# Patient Record
Sex: FEMALE | Race: WHITE | NOT HISPANIC OR LATINO | URBAN - METROPOLITAN AREA
[De-identification: names, ages, dates, MRNs, and addresses within clinical notes are randomized per-mention and may not be internally consistent; named-entity substitution may affect disease eponyms.]

---

## 2017-02-06 PROBLEM — D04.60 CARCINOMA IN SITU OF SKIN OF UNSPECIFIED UPPER LIMB, INCLUDING SHOULDER: Status: ACTIVE | Noted: 2017-02-06

## 2017-12-06 ENCOUNTER — APPOINTMENT (RX ONLY)
Dept: URBAN - METROPOLITAN AREA CLINIC 4 | Facility: CLINIC | Age: 82
Setting detail: DERMATOLOGY
End: 2017-12-06

## 2017-12-06 DIAGNOSIS — Z85.828 PERSONAL HISTORY OF OTHER MALIGNANT NEOPLASM OF SKIN: ICD-10-CM

## 2017-12-06 DIAGNOSIS — D18.0 HEMANGIOMA: ICD-10-CM

## 2017-12-06 DIAGNOSIS — L81.4 OTHER MELANIN HYPERPIGMENTATION: ICD-10-CM

## 2017-12-06 DIAGNOSIS — L57.0 ACTINIC KERATOSIS: ICD-10-CM

## 2017-12-06 DIAGNOSIS — D22 MELANOCYTIC NEVI: ICD-10-CM

## 2017-12-06 DIAGNOSIS — L82.1 OTHER SEBORRHEIC KERATOSIS: ICD-10-CM

## 2017-12-06 DIAGNOSIS — L82.0 INFLAMED SEBORRHEIC KERATOSIS: ICD-10-CM

## 2017-12-06 DIAGNOSIS — L57.8 OTHER SKIN CHANGES DUE TO CHRONIC EXPOSURE TO NONIONIZING RADIATION: ICD-10-CM

## 2017-12-06 PROBLEM — D18.01 HEMANGIOMA OF SKIN AND SUBCUTANEOUS TISSUE: Status: ACTIVE | Noted: 2017-12-06

## 2017-12-06 PROBLEM — D22.5 MELANOCYTIC NEVI OF TRUNK: Status: ACTIVE | Noted: 2017-12-06

## 2017-12-06 PROCEDURE — 17003 DESTRUCT PREMALG LES 2-14: CPT

## 2017-12-06 PROCEDURE — 99213 OFFICE O/P EST LOW 20 MIN: CPT | Mod: 25

## 2017-12-06 PROCEDURE — ? LIQUID NITROGEN

## 2017-12-06 PROCEDURE — 17000 DESTRUCT PREMALG LESION: CPT | Mod: 59

## 2017-12-06 PROCEDURE — 17110 DESTRUCTION B9 LES UP TO 14: CPT

## 2017-12-06 PROCEDURE — ? COUNSELING

## 2017-12-06 ASSESSMENT — LOCATION SIMPLE DESCRIPTION DERM
LOCATION SIMPLE: RIGHT TEMPLE
LOCATION SIMPLE: LEFT HAND
LOCATION SIMPLE: LEFT CHEEK
LOCATION SIMPLE: CHEST
LOCATION SIMPLE: RIGHT CHEEK
LOCATION SIMPLE: LEFT ANTERIOR NECK
LOCATION SIMPLE: NOSE
LOCATION SIMPLE: RIGHT HAND
LOCATION SIMPLE: RIGHT UPPER BACK

## 2017-12-06 ASSESSMENT — LOCATION ZONE DERM
LOCATION ZONE: HAND
LOCATION ZONE: NOSE
LOCATION ZONE: TRUNK
LOCATION ZONE: NECK
LOCATION ZONE: FACE

## 2017-12-06 ASSESSMENT — LOCATION DETAILED DESCRIPTION DERM
LOCATION DETAILED: LEFT MEDIAL MALAR CHEEK
LOCATION DETAILED: LEFT SUPERIOR ANTERIOR NECK
LOCATION DETAILED: RIGHT SUPERIOR UPPER BACK
LOCATION DETAILED: RIGHT SUPERIOR MEDIAL UPPER BACK
LOCATION DETAILED: UPPER STERNUM
LOCATION DETAILED: RIGHT ULNAR DORSAL HAND
LOCATION DETAILED: RIGHT CENTRAL TEMPLE
LOCATION DETAILED: LEFT ULNAR DORSAL HAND
LOCATION DETAILED: RIGHT INFERIOR CENTRAL MALAR CHEEK
LOCATION DETAILED: RIGHT LATERAL MALAR CHEEK
LOCATION DETAILED: NASAL ROOT

## 2017-12-06 NOTE — PROCEDURE: LIQUID NITROGEN
Duration Of Freeze Thaw-Cycle (Seconds): 3
Detail Level: Simple
Number Of Freeze-Thaw Cycles: 2 freeze-thaw cycles
Render Post-Care Instructions In Note?: no
Post-Care Instructions: I reviewed with the patient in detail post-care instructions. Patient is to wear sunprotection, and avoid picking at any of the treated lesions. Pt may apply Vaseline to crusted or scabbing areas.
Consent: The patient's consent was obtained including but not limited to risks of crusting, scabbing, blistering, scarring, darker or lighter pigmentary change, recurrence, incomplete removal and infection.
Aperture Size (Optional): C
Detail Level: Detailed
Medical Necessity Clause: This procedure was medically necessary because the lesions that were treated were:
Medical Necessity Information: It is in your best interest to select a reason for this procedure from the list below. All of these items fulfill various CMS LCD requirements except the new and changing color options.

## 2018-06-13 ENCOUNTER — APPOINTMENT (RX ONLY)
Dept: URBAN - METROPOLITAN AREA CLINIC 4 | Facility: CLINIC | Age: 83
Setting detail: DERMATOLOGY
End: 2018-06-13

## 2018-06-13 DIAGNOSIS — L57.8 OTHER SKIN CHANGES DUE TO CHRONIC EXPOSURE TO NONIONIZING RADIATION: ICD-10-CM

## 2018-06-13 DIAGNOSIS — L57.0 ACTINIC KERATOSIS: ICD-10-CM

## 2018-06-13 DIAGNOSIS — D18.0 HEMANGIOMA: ICD-10-CM

## 2018-06-13 DIAGNOSIS — L82.1 OTHER SEBORRHEIC KERATOSIS: ICD-10-CM

## 2018-06-13 DIAGNOSIS — L81.4 OTHER MELANIN HYPERPIGMENTATION: ICD-10-CM

## 2018-06-13 DIAGNOSIS — D22 MELANOCYTIC NEVI: ICD-10-CM

## 2018-06-13 DIAGNOSIS — L82.0 INFLAMED SEBORRHEIC KERATOSIS: ICD-10-CM

## 2018-06-13 PROBLEM — D18.01 HEMANGIOMA OF SKIN AND SUBCUTANEOUS TISSUE: Status: ACTIVE | Noted: 2018-06-13

## 2018-06-13 PROBLEM — D48.5 NEOPLASM OF UNCERTAIN BEHAVIOR OF SKIN: Status: ACTIVE | Noted: 2018-06-13

## 2018-06-13 PROBLEM — D22.5 MELANOCYTIC NEVI OF TRUNK: Status: ACTIVE | Noted: 2018-06-13

## 2018-06-13 PROCEDURE — 17000 DESTRUCT PREMALG LESION: CPT | Mod: 59

## 2018-06-13 PROCEDURE — 11100: CPT | Mod: 59

## 2018-06-13 PROCEDURE — 17110 DESTRUCTION B9 LES UP TO 14: CPT

## 2018-06-13 PROCEDURE — 17003 DESTRUCT PREMALG LES 2-14: CPT

## 2018-06-13 PROCEDURE — ? BIOPSY BY SHAVE METHOD

## 2018-06-13 PROCEDURE — 99214 OFFICE O/P EST MOD 30 MIN: CPT | Mod: 25

## 2018-06-13 PROCEDURE — ? LIQUID NITROGEN

## 2018-06-13 PROCEDURE — ? COUNSELING

## 2018-06-13 ASSESSMENT — LOCATION DETAILED DESCRIPTION DERM
LOCATION DETAILED: RIGHT SUPERIOR MEDIAL UPPER BACK
LOCATION DETAILED: RIGHT RADIAL DORSAL HAND
LOCATION DETAILED: LEFT CENTRAL MALAR CHEEK
LOCATION DETAILED: RIGHT PROXIMAL DORSAL FOREARM
LOCATION DETAILED: LEFT INFERIOR FOREHEAD
LOCATION DETAILED: LEFT MEDIAL SUPERIOR CHEST
LOCATION DETAILED: RIGHT SUPERIOR LATERAL BUCCAL CHEEK
LOCATION DETAILED: LEFT SUPERIOR MEDIAL UPPER BACK
LOCATION DETAILED: NASAL DORSUM
LOCATION DETAILED: LEFT INFERIOR MEDIAL FOREHEAD
LOCATION DETAILED: LEFT INFERIOR CENTRAL MALAR CHEEK
LOCATION DETAILED: LEFT DISTAL DORSAL FOREARM
LOCATION DETAILED: NASAL SUPRATIP
LOCATION DETAILED: NASAL ROOT
LOCATION DETAILED: GLABELLA
LOCATION DETAILED: LEFT RADIAL DORSAL HAND
LOCATION DETAILED: RIGHT LATERAL MALAR CHEEK
LOCATION DETAILED: RIGHT MEDIAL MALAR CHEEK
LOCATION DETAILED: RIGHT MID-UPPER BACK
LOCATION DETAILED: RIGHT INFERIOR CENTRAL MALAR CHEEK
LOCATION DETAILED: LEFT SUPERIOR UPPER BACK

## 2018-06-13 ASSESSMENT — LOCATION SIMPLE DESCRIPTION DERM
LOCATION SIMPLE: RIGHT CHEEK
LOCATION SIMPLE: LEFT FOREARM
LOCATION SIMPLE: NOSE
LOCATION SIMPLE: RIGHT FOREARM
LOCATION SIMPLE: LEFT CHEEK
LOCATION SIMPLE: LEFT FOREHEAD
LOCATION SIMPLE: LEFT UPPER BACK
LOCATION SIMPLE: RIGHT HAND
LOCATION SIMPLE: CHEST
LOCATION SIMPLE: RIGHT UPPER BACK
LOCATION SIMPLE: GLABELLA
LOCATION SIMPLE: LEFT HAND

## 2018-06-13 ASSESSMENT — LOCATION ZONE DERM
LOCATION ZONE: TRUNK
LOCATION ZONE: HAND
LOCATION ZONE: ARM
LOCATION ZONE: FACE
LOCATION ZONE: NOSE

## 2018-06-13 NOTE — PROCEDURE: LIQUID NITROGEN
Consent: The patient's consent was obtained including but not limited to risks of crusting, scabbing, blistering, scarring, darker or lighter pigmentary change, recurrence, incomplete removal and infection.
Render Post-Care Instructions In Note?: no
Detail Level: Simple
Duration Of Freeze Thaw-Cycle (Seconds): 3
Post-Care Instructions: I reviewed with the patient in detail post-care instructions. Patient is to wear sunprotection, and avoid picking at any of the treated lesions. Pt may apply Vaseline to crusted or scabbing areas.
Number Of Freeze-Thaw Cycles: 2 freeze-thaw cycles
Aperture Size (Optional): C
Number Of Freeze-Thaw Cycles: 1 freeze-thaw cycle
Medical Necessity Information: It is in your best interest to select a reason for this procedure from the list below. All of these items fulfill various CMS LCD requirements except the new and changing color options.
Detail Level: Detailed
Medical Necessity Clause: This procedure was medically necessary because the lesions that were treated were:

## 2018-06-13 NOTE — PROCEDURE: BIOPSY BY SHAVE METHOD
Biopsy Type: H and E
Dressing: bandage
X Size Of Lesion In Cm: 0
Biopsy Method: Dermablade
Notification Instructions: Patient will be notified of biopsy results. However, patient instructed to call the office if not contacted within 2 weeks.
Lab Facility: 
Electrodesiccation And Curettage Text: The wound bed was treated with electrodesiccation and curettage after the biopsy was performed.
Lab: [id number]
Bill For Surgical Tray: no
Post-Care Instructions: I reviewed with the patient in detail post-care instructions. Patient is to keep the biopsy site dry overnight, and then apply bacitracin twice daily until healed. Patient may apply hydrogen peroxide soaks to remove any crusting.
Anesthesia Type: 1% lidocaine with epinephrine
Silver Nitrate Text: The wound bed was treated with silver nitrate after the biopsy was performed.
Was A Bandage Applied: Yes
Type Of Destruction Used: Electrodesiccation
Anesthesia Volume In Cc: 0.5
Cryotherapy Text: The wound bed was treated with cryotherapy after the biopsy was performed.
Wound Care: Bacitracin
Curettage Text: The wound bed was treated with curettage after the biopsy was performed.
Electrodesiccation Text: The wound bed was treated with electrodesiccation after the biopsy was performed.
Detail Level: Detailed
Hemostasis: Drysol
Billing Type: Third-Party Bill
Consent: Written consent was obtained and risks were reviewed including but not limited to scarring, infection, bleeding, scabbing, incomplete removal, nerve damage and allergy to anesthesia.

## 2018-06-19 ENCOUNTER — APPOINTMENT (RX ONLY)
Dept: URBAN - METROPOLITAN AREA CLINIC 4 | Facility: CLINIC | Age: 83
Setting detail: DERMATOLOGY
End: 2018-06-19

## 2018-06-19 ENCOUNTER — RX ONLY (OUTPATIENT)
Age: 83
Setting detail: RX ONLY
End: 2018-06-19

## 2018-06-19 PROBLEM — C44.91 BASAL CELL CARCINOMA OF SKIN, UNSPECIFIED: Status: ACTIVE | Noted: 2018-06-19

## 2018-06-19 PROCEDURE — ? MOHS SURGERY PHONE CONSULTATION

## 2018-06-19 RX ORDER — AMOXICILLIN 500 MG/1
CAPSULE ORAL
Qty: 4 | Refills: 0

## 2018-10-09 ENCOUNTER — APPOINTMENT (RX ONLY)
Dept: URBAN - METROPOLITAN AREA CLINIC 36 | Facility: CLINIC | Age: 83
Setting detail: DERMATOLOGY
End: 2018-10-09

## 2018-10-09 PROBLEM — C44.91 BASAL CELL CARCINOMA OF SKIN, UNSPECIFIED: Status: ACTIVE | Noted: 2018-10-09

## 2018-10-09 PROCEDURE — 11900 INJECT SKIN LESIONS </W 7: CPT

## 2018-10-09 PROCEDURE — ? INJECTION

## 2018-10-09 NOTE — PROCEDURE: INJECTION
Units: mg
Route: IL
Procedure Information: Please note that the numeric value listed in the Medication (1) and associated J-code units and Medication (2) and associated J-code units variables are j-code amounts and do not represent either the concentration or the total amount of the medications injected.  I strongly recommend selecting no to the Render J-code information in note question. This will allow your note to be more clear. If you are billing j-codes with your injection codes you need to document the total amount of the medication injected. This amount should match the j-code units. For example, if you are injecting Triamcinolone 40mg as an intramuscular injection you would select 40 for the dose field and mg for the units. This would allow you to document  with 4 units (40mg = 10mg x 4). The total volume is not used to calculate j-codes only the amount of the medication administered.
units
Bill J-Code: yes
Consent: The risks of the medication was reviewed with the patient.
Detail Level: None
Post-Care Instructions: I reviewed with the patient in detail post-care instructions. Patient understands to keep the injection sites clean and call the clinic if there is any redness, swelling or pain.
Dose Administered (Numbers Only - Mg, G, Mcg, Units, Cc): 0.2
Dose Administered (Numbers Only - Mg, G, Mcg, Units, Cc): 0
Medication (1) And Associated J-Code Units: Bleomycin, 15 units

## 2018-10-30 ENCOUNTER — APPOINTMENT (RX ONLY)
Dept: URBAN - METROPOLITAN AREA CLINIC 36 | Facility: CLINIC | Age: 83
Setting detail: DERMATOLOGY
End: 2018-10-30

## 2018-10-30 PROBLEM — C44.91 BASAL CELL CARCINOMA OF SKIN, UNSPECIFIED: Status: ACTIVE | Noted: 2018-10-30

## 2018-10-30 PROCEDURE — 11900 INJECT SKIN LESIONS </W 7: CPT

## 2018-10-30 PROCEDURE — ? INJECTION

## 2018-10-30 NOTE — PROCEDURE: INJECTION
Consent: The risks of the medication was reviewed with the patient.
Bill J-Code: yes
Dose Administered (Numbers Only - Mg, G, Mcg, Units, Cc): 0
Post-Care Instructions: I reviewed with the patient in detail post-care instructions. Patient understands to keep the injection sites clean and call the clinic if there is any redness, swelling or pain.
Procedure Information: Please note that the numeric value listed in the Medication (1) and associated J-code units and Medication (2) and associated J-code units variables are j-code amounts and do not represent either the concentration or the total amount of the medications injected.  I strongly recommend selecting no to the Render J-code information in note question. This will allow your note to be more clear. If you are billing j-codes with your injection codes you need to document the total amount of the medication injected. This amount should match the j-code units. For example, if you are injecting Triamcinolone 40mg as an intramuscular injection you would select 40 for the dose field and mg for the units. This would allow you to document  with 4 units (40mg = 10mg x 4). The total volume is not used to calculate j-codes only the amount of the medication administered.
Medication (1) And Associated J-Code Units: Bleomycin, 15 units
Treatment Number: 2
Detail Level: None
Units: mg
Route: IL
units

## 2019-02-13 ENCOUNTER — APPOINTMENT (RX ONLY)
Dept: URBAN - METROPOLITAN AREA CLINIC 36 | Facility: CLINIC | Age: 84
Setting detail: DERMATOLOGY
End: 2019-02-13

## 2019-02-13 PROBLEM — C44.319 BASAL CELL CARCINOMA OF SKIN OF OTHER PARTS OF FACE: Status: ACTIVE | Noted: 2019-02-13

## 2019-02-13 PROCEDURE — 99024 POSTOP FOLLOW-UP VISIT: CPT

## 2019-02-13 PROCEDURE — ? OBSERVATION

## 2020-08-10 ENCOUNTER — APPOINTMENT (OUTPATIENT)
Dept: LAB | Facility: MEDICAL CENTER | Age: 85
End: 2020-08-10
Attending: FAMILY MEDICINE

## 2020-09-17 ENCOUNTER — APPOINTMENT (RX ONLY)
Dept: URBAN - METROPOLITAN AREA CLINIC 4 | Facility: CLINIC | Age: 85
Setting detail: DERMATOLOGY
End: 2020-09-17

## 2020-09-17 DIAGNOSIS — L81.4 OTHER MELANIN HYPERPIGMENTATION: ICD-10-CM

## 2020-09-17 DIAGNOSIS — Z85.828 PERSONAL HISTORY OF OTHER MALIGNANT NEOPLASM OF SKIN: ICD-10-CM

## 2020-09-17 DIAGNOSIS — D22 MELANOCYTIC NEVI: ICD-10-CM

## 2020-09-17 DIAGNOSIS — L57.0 ACTINIC KERATOSIS: ICD-10-CM

## 2020-09-17 DIAGNOSIS — L57.8 OTHER SKIN CHANGES DUE TO CHRONIC EXPOSURE TO NONIONIZING RADIATION: ICD-10-CM

## 2020-09-17 DIAGNOSIS — L82.1 OTHER SEBORRHEIC KERATOSIS: ICD-10-CM

## 2020-09-17 DIAGNOSIS — D18.0 HEMANGIOMA: ICD-10-CM

## 2020-09-17 PROBLEM — D18.01 HEMANGIOMA OF SKIN AND SUBCUTANEOUS TISSUE: Status: ACTIVE | Noted: 2020-09-17

## 2020-09-17 PROBLEM — D22.5 MELANOCYTIC NEVI OF TRUNK: Status: ACTIVE | Noted: 2020-09-17

## 2020-09-17 PROBLEM — D48.5 NEOPLASM OF UNCERTAIN BEHAVIOR OF SKIN: Status: ACTIVE | Noted: 2020-09-17

## 2020-09-17 PROCEDURE — 11102 TANGNTL BX SKIN SINGLE LES: CPT

## 2020-09-17 PROCEDURE — ? LIQUID NITROGEN

## 2020-09-17 PROCEDURE — ? COUNSELING

## 2020-09-17 PROCEDURE — 17003 DESTRUCT PREMALG LES 2-14: CPT

## 2020-09-17 PROCEDURE — 99214 OFFICE O/P EST MOD 30 MIN: CPT | Mod: 25

## 2020-09-17 PROCEDURE — ? BIOPSY BY SHAVE METHOD

## 2020-09-17 PROCEDURE — 11103 TANGNTL BX SKIN EA SEP/ADDL: CPT

## 2020-09-17 PROCEDURE — 17000 DESTRUCT PREMALG LESION: CPT | Mod: 59

## 2020-09-17 ASSESSMENT — LOCATION SIMPLE DESCRIPTION DERM
LOCATION SIMPLE: LEFT NOSE
LOCATION SIMPLE: RIGHT HAND
LOCATION SIMPLE: LEFT HAND
LOCATION SIMPLE: LEFT ANTERIOR NECK
LOCATION SIMPLE: RIGHT UPPER BACK
LOCATION SIMPLE: LEFT CHEEK
LOCATION SIMPLE: RIGHT CHEEK

## 2020-09-17 ASSESSMENT — LOCATION ZONE DERM
LOCATION ZONE: HAND
LOCATION ZONE: NOSE
LOCATION ZONE: FACE
LOCATION ZONE: TRUNK
LOCATION ZONE: NECK

## 2020-09-17 ASSESSMENT — LOCATION DETAILED DESCRIPTION DERM
LOCATION DETAILED: LEFT MEDIAL MALAR CHEEK
LOCATION DETAILED: RIGHT INFERIOR CENTRAL MALAR CHEEK
LOCATION DETAILED: RIGHT MEDIAL MALAR CHEEK
LOCATION DETAILED: RIGHT SUPERIOR UPPER BACK
LOCATION DETAILED: LEFT ULNAR DORSAL HAND
LOCATION DETAILED: RIGHT SUPERIOR MEDIAL UPPER BACK
LOCATION DETAILED: RIGHT ULNAR DORSAL HAND
LOCATION DETAILED: LEFT NASAL SIDEWALL
LOCATION DETAILED: LEFT SUPERIOR ANTERIOR NECK
LOCATION DETAILED: RIGHT CENTRAL MALAR CHEEK

## 2020-09-30 ENCOUNTER — APPOINTMENT (RX ONLY)
Dept: URBAN - METROPOLITAN AREA CLINIC 4 | Facility: CLINIC | Age: 85
Setting detail: DERMATOLOGY
End: 2020-09-30

## 2020-09-30 DIAGNOSIS — L57.8 OTHER SKIN CHANGES DUE TO CHRONIC EXPOSURE TO NONIONIZING RADIATION: ICD-10-CM

## 2020-09-30 PROBLEM — D04.39 CARCINOMA IN SITU OF SKIN OF OTHER PARTS OF FACE: Status: ACTIVE | Noted: 2020-09-30

## 2020-09-30 PROBLEM — C44.01 BASAL CELL CARCINOMA OF SKIN OF LIP: Status: ACTIVE | Noted: 2020-09-30

## 2020-09-30 PROBLEM — C44.321 SQUAMOUS CELL CARCINOMA OF SKIN OF NOSE: Status: ACTIVE | Noted: 2020-09-30

## 2020-09-30 PROCEDURE — 11103 TANGNTL BX SKIN EA SEP/ADDL: CPT

## 2020-09-30 PROCEDURE — ? BIOPSY BY SHAVE METHOD AND DESTRUCTION

## 2020-09-30 PROCEDURE — 99212 OFFICE O/P EST SF 10 MIN: CPT | Mod: 25

## 2020-09-30 PROCEDURE — ? SHAVE REMOVAL AND DESTRUCTION

## 2020-09-30 PROCEDURE — 11102 TANGNTL BX SKIN SINGLE LES: CPT | Mod: 59

## 2020-09-30 PROCEDURE — ? COUNSELING

## 2020-09-30 PROCEDURE — 11310 SHAVE SKIN LESION 0.5 CM/<: CPT

## 2020-09-30 ASSESSMENT — LOCATION DETAILED DESCRIPTION DERM
LOCATION DETAILED: RIGHT MEDIAL MALAR CHEEK
LOCATION DETAILED: LEFT INFERIOR CENTRAL MALAR CHEEK
LOCATION DETAILED: RIGHT CENTRAL MALAR CHEEK
LOCATION DETAILED: RIGHT INFERIOR MEDIAL FOREHEAD
LOCATION DETAILED: LEFT MEDIAL MALAR CHEEK
LOCATION DETAILED: RIGHT INFERIOR FOREHEAD
LOCATION DETAILED: LEFT SUPERIOR CENTRAL BUCCAL CHEEK
LOCATION DETAILED: LEFT INFERIOR MEDIAL FOREHEAD

## 2020-09-30 ASSESSMENT — LOCATION SIMPLE DESCRIPTION DERM
LOCATION SIMPLE: LEFT CHEEK
LOCATION SIMPLE: RIGHT CHEEK
LOCATION SIMPLE: RIGHT FOREHEAD
LOCATION SIMPLE: LEFT FOREHEAD

## 2020-09-30 ASSESSMENT — LOCATION ZONE DERM: LOCATION ZONE: FACE

## 2020-09-30 NOTE — PROCEDURE: SHAVE REMOVAL AND DESTRUCTION
Detail Level: Detailed
Size Of Lesion In Cm: 0
Size After Destruction (Required For Destruction Billing): 0.4
Anesthesia Type: 1% lidocaine with epinephrine
Hemostasis: Drysol
Cautery Type: electrodesiccation
Was Curettage Performed?: Yes
Number Of Curettages: 3
Wound Care: Petrolatum
Dressing: dry sterile dressing
Bill As?: Note: Bill Malignant Destruction If Path Confirms Malignant Lesion. Only Bill As Shave Removal If Path Comes Back Benign. Do Not Bill Shave Removal On Malignant Lesions.: Shave Removal
Lab: [id number]
Lab Facility: 
Render Path Notes In Note?: No
Consent: Written consent was obtained and risks were reviewed including but not limited to scarring, infection, bleeding, scabbing, incomplete removal, nerve damage and allergy to anesthesia.
Post-Care Instructions: I reviewed with the patient in detail post-care instructions. Patient is to keep the biopsy site dry overnight, and then apply bacitracin twice daily until healed. Patient may apply hydrogen peroxide soaks to remove any crusting.
Notification Instructions: Patient will be notified of biopsy results. However, patient instructed to call the office if not contacted within 2 weeks.
Billing Type: Third-Party Bill

## 2020-09-30 NOTE — PROCEDURE: BIOPSY BY SHAVE METHOD AND DESTRUCTION
Detail Level: Detailed
Biopsy Type: H and E
Bill As?: Biopsy by Shave Method
Size Of Lesion In Cm (Optional): 0
Size Of Lesion After Curettage: 1
Anesthesia Type: 1% lidocaine with epinephrine
Anesthesia Volume In Cc: 0.5
Hemostasis: Drysol
Destruction Type: electrodesiccation
Number Of Curettages: 3
Wound Care: Petrolatum
Lab: [id number]
Lab Facility: 
Render Path Notes In Note?: No
Consent: Written consent was obtained and risks were reviewed including but not limited to scarring, infection, bleeding, scabbing, incomplete removal, nerve damage and allergy to anesthesia.
Post-Care Instructions: I reviewed with the patient in detail post-care instructions. Patient is to keep the biopsy site dry overnight, and then apply bacitracin twice daily until healed. Patient may apply hydrogen peroxide soaks to remove any crusting.
Notification Instructions: Patient will be notified of biopsy results. However, patient instructed to call the office if not contacted within 2 weeks.
Billing Type: Third-Party Bill
Size Of Lesion After Curettage: 0.6

## 2021-01-20 DIAGNOSIS — Z23 NEED FOR VACCINATION: ICD-10-CM

## 2021-06-12 ENCOUNTER — APPOINTMENT (OUTPATIENT)
Dept: RADIOLOGY | Facility: MEDICAL CENTER | Age: 86
DRG: 023 | End: 2021-06-12
Attending: PSYCHIATRY & NEUROLOGY
Payer: COMMERCIAL

## 2021-06-12 ENCOUNTER — APPOINTMENT (OUTPATIENT)
Dept: RADIOLOGY | Facility: MEDICAL CENTER | Age: 86
DRG: 023 | End: 2021-06-12
Attending: EMERGENCY MEDICINE
Payer: COMMERCIAL

## 2021-06-12 ENCOUNTER — HOSPITAL ENCOUNTER (INPATIENT)
Facility: MEDICAL CENTER | Age: 86
LOS: 8 days | DRG: 023 | End: 2021-06-20
Attending: EMERGENCY MEDICINE | Admitting: STUDENT IN AN ORGANIZED HEALTH CARE EDUCATION/TRAINING PROGRAM
Payer: COMMERCIAL

## 2021-06-12 DIAGNOSIS — I63.511 ACUTE RIGHT MCA STROKE (HCC): ICD-10-CM

## 2021-06-12 DIAGNOSIS — R79.89 ELEVATED TROPONIN: ICD-10-CM

## 2021-06-12 DIAGNOSIS — I63.511 ACUTE STROKE DUE TO OCCLUSION OF RIGHT MIDDLE CEREBRAL ARTERY (HCC): ICD-10-CM

## 2021-06-12 PROBLEM — I10 HYPERTENSION: Status: ACTIVE | Noted: 2021-06-12

## 2021-06-12 PROBLEM — I35.0 AORTIC STENOSIS: Status: ACTIVE | Noted: 2021-06-12

## 2021-06-12 LAB
ABO GROUP BLD: NORMAL
ALBUMIN SERPL BCP-MCNC: 3 G/DL (ref 3.2–4.9)
ALBUMIN/GLOB SERPL: 1 G/DL
ALP SERPL-CCNC: 61 U/L (ref 30–99)
ALT SERPL-CCNC: 14 U/L (ref 2–50)
ANION GAP SERPL CALC-SCNC: 12 MMOL/L (ref 7–16)
APTT PPP: 35.9 SEC (ref 24.7–36)
AST SERPL-CCNC: 25 U/L (ref 12–45)
BASOPHILS # BLD AUTO: 0.1 % (ref 0–1.8)
BASOPHILS # BLD: 0.02 K/UL (ref 0–0.12)
BILIRUB SERPL-MCNC: 0.8 MG/DL (ref 0.1–1.5)
BLD GP AB SCN SERPL QL: NORMAL
BUN SERPL-MCNC: 20 MG/DL (ref 8–22)
CALCIUM SERPL-MCNC: 8.2 MG/DL (ref 8.5–10.5)
CHLORIDE SERPL-SCNC: 106 MMOL/L (ref 96–112)
CO2 SERPL-SCNC: 20 MMOL/L (ref 20–33)
CREAT SERPL-MCNC: 0.84 MG/DL (ref 0.5–1.4)
EKG IMPRESSION: NORMAL
EOSINOPHIL # BLD AUTO: 0 K/UL (ref 0–0.51)
EOSINOPHIL NFR BLD: 0 % (ref 0–6.9)
ERYTHROCYTE [DISTWIDTH] IN BLOOD BY AUTOMATED COUNT: 53.3 FL (ref 35.9–50)
GLOBULIN SER CALC-MCNC: 2.9 G/DL (ref 1.9–3.5)
GLUCOSE SERPL-MCNC: 99 MG/DL (ref 65–99)
HCT VFR BLD AUTO: 33.8 % (ref 37–47)
HGB BLD-MCNC: 10.9 G/DL (ref 12–16)
IMM GRANULOCYTES # BLD AUTO: 0.08 K/UL (ref 0–0.11)
IMM GRANULOCYTES NFR BLD AUTO: 0.6 % (ref 0–0.9)
INR PPP: 1.28 (ref 0.87–1.13)
LYMPHOCYTES # BLD AUTO: 0.97 K/UL (ref 1–4.8)
LYMPHOCYTES NFR BLD: 6.8 % (ref 22–41)
MCH RBC QN AUTO: 30 PG (ref 27–33)
MCHC RBC AUTO-ENTMCNC: 32.2 G/DL (ref 33.6–35)
MCV RBC AUTO: 93.1 FL (ref 81.4–97.8)
MONOCYTES # BLD AUTO: 0.69 K/UL (ref 0–0.85)
MONOCYTES NFR BLD AUTO: 4.8 % (ref 0–13.4)
NEUTROPHILS # BLD AUTO: 12.52 K/UL (ref 2–7.15)
NEUTROPHILS NFR BLD: 87.7 % (ref 44–72)
NRBC # BLD AUTO: 0 K/UL
NRBC BLD-RTO: 0 /100 WBC
PLATELET # BLD AUTO: 145 K/UL (ref 164–446)
PMV BLD AUTO: 10.8 FL (ref 9–12.9)
POTASSIUM SERPL-SCNC: 3.8 MMOL/L (ref 3.6–5.5)
PROT SERPL-MCNC: 5.9 G/DL (ref 6–8.2)
PROTHROMBIN TIME: 15.7 SEC (ref 12–14.6)
RBC # BLD AUTO: 3.63 M/UL (ref 4.2–5.4)
RH BLD: NORMAL
SODIUM SERPL-SCNC: 138 MMOL/L (ref 135–145)
TROPONIN T SERPL-MCNC: 141 NG/L (ref 6–19)
WBC # BLD AUTO: 14.3 K/UL (ref 4.8–10.8)

## 2021-06-12 PROCEDURE — 86901 BLOOD TYPING SEROLOGIC RH(D): CPT

## 2021-06-12 PROCEDURE — 700111 HCHG RX REV CODE 636 W/ 250 OVERRIDE (IP)

## 2021-06-12 PROCEDURE — 85730 THROMBOPLASTIN TIME PARTIAL: CPT

## 2021-06-12 PROCEDURE — C1769 GUIDE WIRE: HCPCS

## 2021-06-12 PROCEDURE — 700105 HCHG RX REV CODE 258: Performed by: INTERNAL MEDICINE

## 2021-06-12 PROCEDURE — 99291 CRITICAL CARE FIRST HOUR: CPT | Performed by: INTERNAL MEDICINE

## 2021-06-12 PROCEDURE — 86900 BLOOD TYPING SEROLOGIC ABO: CPT

## 2021-06-12 PROCEDURE — 770022 HCHG ROOM/CARE - ICU (200)

## 2021-06-12 PROCEDURE — 86850 RBC ANTIBODY SCREEN: CPT

## 2021-06-12 PROCEDURE — 0042T CT-CEREBRAL PERFUSION ANALYSIS: CPT

## 2021-06-12 PROCEDURE — 700111 HCHG RX REV CODE 636 W/ 250 OVERRIDE (IP): Performed by: INTERNAL MEDICINE

## 2021-06-12 PROCEDURE — 76377 3D RENDER W/INTRP POSTPROCES: CPT

## 2021-06-12 PROCEDURE — 700117 HCHG RX CONTRAST REV CODE 255: Performed by: EMERGENCY MEDICINE

## 2021-06-12 PROCEDURE — 84484 ASSAY OF TROPONIN QUANT: CPT

## 2021-06-12 PROCEDURE — 700111 HCHG RX REV CODE 636 W/ 250 OVERRIDE (IP): Performed by: RADIOLOGY

## 2021-06-12 PROCEDURE — B31R1ZZ FLUOROSCOPY OF INTRACRANIAL ARTERIES USING LOW OSMOLAR CONTRAST: ICD-10-PCS | Performed by: RADIOLOGY

## 2021-06-12 PROCEDURE — 99291 CRITICAL CARE FIRST HOUR: CPT

## 2021-06-12 PROCEDURE — 96374 THER/PROPH/DIAG INJ IV PUSH: CPT

## 2021-06-12 PROCEDURE — 99223 1ST HOSP IP/OBS HIGH 75: CPT | Performed by: STUDENT IN AN ORGANIZED HEALTH CARE EDUCATION/TRAINING PROGRAM

## 2021-06-12 PROCEDURE — 70496 CT ANGIOGRAPHY HEAD: CPT

## 2021-06-12 PROCEDURE — 70498 CT ANGIOGRAPHY NECK: CPT

## 2021-06-12 PROCEDURE — 85025 COMPLETE CBC W/AUTO DIFF WBC: CPT

## 2021-06-12 PROCEDURE — 70450 CT HEAD/BRAIN W/O DYE: CPT

## 2021-06-12 PROCEDURE — 03CG3ZZ EXTIRPATION OF MATTER FROM INTRACRANIAL ARTERY, PERCUTANEOUS APPROACH: ICD-10-PCS | Performed by: RADIOLOGY

## 2021-06-12 PROCEDURE — 93005 ELECTROCARDIOGRAM TRACING: CPT | Performed by: EMERGENCY MEDICINE

## 2021-06-12 PROCEDURE — 80053 COMPREHEN METABOLIC PANEL: CPT

## 2021-06-12 PROCEDURE — 85610 PROTHROMBIN TIME: CPT

## 2021-06-12 PROCEDURE — 700117 HCHG RX CONTRAST REV CODE 255: Performed by: PSYCHIATRY & NEUROLOGY

## 2021-06-12 PROCEDURE — 99291 CRITICAL CARE FIRST HOUR: CPT | Performed by: PSYCHIATRY & NEUROLOGY

## 2021-06-12 PROCEDURE — 96375 TX/PRO/DX INJ NEW DRUG ADDON: CPT

## 2021-06-12 RX ORDER — SODIUM CHLORIDE 9 MG/ML
500 INJECTION, SOLUTION INTRAVENOUS
Status: ACTIVE | OUTPATIENT
Start: 2021-06-12 | End: 2021-06-13

## 2021-06-12 RX ORDER — ATORVASTATIN CALCIUM 20 MG/1
40 TABLET, FILM COATED ORAL EVERY EVENING
Status: DISCONTINUED | OUTPATIENT
Start: 2021-06-12 | End: 2021-06-12

## 2021-06-12 RX ORDER — HYDRALAZINE HYDROCHLORIDE 20 MG/ML
10 INJECTION INTRAMUSCULAR; INTRAVENOUS
Status: DISCONTINUED | OUTPATIENT
Start: 2021-06-12 | End: 2021-06-12

## 2021-06-12 RX ORDER — PHENYLEPHRINE HCL IN 0.9% NACL 0.5 MG/5ML
100 SYRINGE (ML) INTRAVENOUS
Status: ACTIVE | OUTPATIENT
Start: 2021-06-12 | End: 2021-06-13

## 2021-06-12 RX ORDER — ATORVASTATIN CALCIUM 40 MG/1
40 TABLET, FILM COATED ORAL EVERY EVENING
Status: DISCONTINUED | OUTPATIENT
Start: 2021-06-12 | End: 2021-06-15

## 2021-06-12 RX ORDER — LABETALOL HYDROCHLORIDE 5 MG/ML
10 INJECTION, SOLUTION INTRAVENOUS
Status: DISCONTINUED | OUTPATIENT
Start: 2021-06-12 | End: 2021-06-12

## 2021-06-12 RX ORDER — ASPIRIN 81 MG/1
81 TABLET, CHEWABLE ORAL DAILY
Status: DISCONTINUED | OUTPATIENT
Start: 2021-06-13 | End: 2021-06-15

## 2021-06-12 RX ORDER — LABETALOL HYDROCHLORIDE 5 MG/ML
10 INJECTION, SOLUTION INTRAVENOUS
Status: DISCONTINUED | OUTPATIENT
Start: 2021-06-12 | End: 2021-06-13

## 2021-06-12 RX ORDER — HEPARIN SODIUM 1000 [USP'U]/ML
INJECTION, SOLUTION INTRAVENOUS; SUBCUTANEOUS
Status: COMPLETED
Start: 2021-06-12 | End: 2021-06-12

## 2021-06-12 RX ORDER — HYDRALAZINE HYDROCHLORIDE 20 MG/ML
10 INJECTION INTRAMUSCULAR; INTRAVENOUS
Status: DISCONTINUED | OUTPATIENT
Start: 2021-06-12 | End: 2021-06-13

## 2021-06-12 RX ORDER — ALENDRONATE SODIUM 70 MG/1
70 TABLET ORAL
COMMUNITY

## 2021-06-12 RX ORDER — POTASSIUM CHLORIDE 20 MEQ/1
20 TABLET, EXTENDED RELEASE ORAL DAILY
Status: ON HOLD | COMMUNITY
End: 2021-06-19

## 2021-06-12 RX ORDER — VERAPAMIL HYDROCHLORIDE 2.5 MG/ML
INJECTION, SOLUTION INTRAVENOUS
Status: COMPLETED
Start: 2021-06-12 | End: 2021-06-12

## 2021-06-12 RX ORDER — SODIUM CHLORIDE, SODIUM LACTATE, POTASSIUM CHLORIDE, CALCIUM CHLORIDE 600; 310; 30; 20 MG/100ML; MG/100ML; MG/100ML; MG/100ML
INJECTION, SOLUTION INTRAVENOUS CONTINUOUS
Status: DISCONTINUED | OUTPATIENT
Start: 2021-06-12 | End: 2021-06-14

## 2021-06-12 RX ORDER — MIDAZOLAM HYDROCHLORIDE 1 MG/ML
.5-2 INJECTION INTRAMUSCULAR; INTRAVENOUS PRN
Status: ACTIVE | OUTPATIENT
Start: 2021-06-12 | End: 2021-06-13

## 2021-06-12 RX ORDER — HEPARIN SODIUM 1000 [USP'U]/ML
1000 INJECTION, SOLUTION INTRAVENOUS; SUBCUTANEOUS ONCE
Status: COMPLETED | OUTPATIENT
Start: 2021-06-12 | End: 2021-06-12

## 2021-06-12 RX ORDER — HYDRALAZINE HYDROCHLORIDE 20 MG/ML
INJECTION INTRAMUSCULAR; INTRAVENOUS
Status: DISPENSED
Start: 2021-06-12 | End: 2021-06-13

## 2021-06-12 RX ORDER — POTASSIUM CHLORIDE 1.5 G/1.58G
20 POWDER, FOR SOLUTION ORAL DAILY
COMMUNITY
End: 2021-06-12

## 2021-06-12 RX ORDER — FUROSEMIDE 20 MG/1
20 TABLET ORAL DAILY
Status: ON HOLD | COMMUNITY
End: 2021-06-19

## 2021-06-12 RX ORDER — VERAPAMIL HYDROCHLORIDE 2.5 MG/ML
5 INJECTION, SOLUTION INTRAVENOUS ONCE
Status: COMPLETED | OUTPATIENT
Start: 2021-06-12 | End: 2021-06-12

## 2021-06-12 RX ADMIN — IOHEXOL 125 ML: 300 INJECTION, SOLUTION INTRAVENOUS at 22:27

## 2021-06-12 RX ADMIN — FENTANYL CITRATE 25 MCG: 50 INJECTION, SOLUTION INTRAMUSCULAR; INTRAVENOUS at 21:46

## 2021-06-12 RX ADMIN — HYDRALAZINE HYDROCHLORIDE 10 MG: 20 INJECTION INTRAMUSCULAR; INTRAVENOUS at 22:47

## 2021-06-12 RX ADMIN — SODIUM CHLORIDE, POTASSIUM CHLORIDE, SODIUM LACTATE AND CALCIUM CHLORIDE 1000 ML: 600; 310; 30; 20 INJECTION, SOLUTION INTRAVENOUS at 23:51

## 2021-06-12 RX ADMIN — IOHEXOL 80 ML: 350 INJECTION, SOLUTION INTRAVENOUS at 18:56

## 2021-06-12 RX ADMIN — IOHEXOL 40 ML: 350 INJECTION, SOLUTION INTRAVENOUS at 18:54

## 2021-06-12 RX ADMIN — FENTANYL CITRATE 25 MCG: 50 INJECTION, SOLUTION INTRAMUSCULAR; INTRAVENOUS at 21:35

## 2021-06-12 RX ADMIN — VERAPAMIL HYDROCHLORIDE 5 MG: 2.5 INJECTION, SOLUTION INTRAVENOUS at 20:35

## 2021-06-12 RX ADMIN — HEPARIN SODIUM 1000 UNITS: 1000 INJECTION, SOLUTION INTRAVENOUS; SUBCUTANEOUS at 20:35

## 2021-06-12 RX ADMIN — NITROGLYCERIN 200 MCG: 20 INJECTION INTRAVENOUS at 20:35

## 2021-06-13 ENCOUNTER — APPOINTMENT (OUTPATIENT)
Dept: RADIOLOGY | Facility: MEDICAL CENTER | Age: 86
DRG: 023 | End: 2021-06-13
Attending: PSYCHIATRY & NEUROLOGY
Payer: COMMERCIAL

## 2021-06-13 ENCOUNTER — APPOINTMENT (OUTPATIENT)
Dept: RADIOLOGY | Facility: MEDICAL CENTER | Age: 86
DRG: 023 | End: 2021-06-13
Attending: EMERGENCY MEDICINE
Payer: COMMERCIAL

## 2021-06-13 PROBLEM — R54 AGE-RELATED PHYSICAL DEBILITY: Status: ACTIVE | Noted: 2021-06-13

## 2021-06-13 PROBLEM — M81.0 OSTEOPOROSIS: Status: ACTIVE | Noted: 2021-06-13

## 2021-06-13 LAB
ABO + RH BLD: NORMAL
ANION GAP SERPL CALC-SCNC: 11 MMOL/L (ref 7–16)
BASOPHILS # BLD AUTO: 0.4 % (ref 0–1.8)
BASOPHILS # BLD: 0.05 K/UL (ref 0–0.12)
BUN SERPL-MCNC: 17 MG/DL (ref 8–22)
CALCIUM SERPL-MCNC: 8.4 MG/DL (ref 8.5–10.5)
CHLORIDE SERPL-SCNC: 106 MMOL/L (ref 96–112)
CHOLEST SERPL-MCNC: 127 MG/DL (ref 100–199)
CO2 SERPL-SCNC: 19 MMOL/L (ref 20–33)
CREAT SERPL-MCNC: 0.78 MG/DL (ref 0.5–1.4)
EOSINOPHIL # BLD AUTO: 0 K/UL (ref 0–0.51)
EOSINOPHIL NFR BLD: 0 % (ref 0–6.9)
ERYTHROCYTE [DISTWIDTH] IN BLOOD BY AUTOMATED COUNT: 53.3 FL (ref 35.9–50)
GLUCOSE SERPL-MCNC: 111 MG/DL (ref 65–99)
HCT VFR BLD AUTO: 33.3 % (ref 37–47)
HDLC SERPL-MCNC: 48 MG/DL
HGB BLD-MCNC: 11 G/DL (ref 12–16)
IMM GRANULOCYTES # BLD AUTO: 0.05 K/UL (ref 0–0.11)
IMM GRANULOCYTES NFR BLD AUTO: 0.4 % (ref 0–0.9)
LDLC SERPL CALC-MCNC: 69 MG/DL
LYMPHOCYTES # BLD AUTO: 0.91 K/UL (ref 1–4.8)
LYMPHOCYTES NFR BLD: 7.9 % (ref 22–41)
MAGNESIUM SERPL-MCNC: 1.5 MG/DL (ref 1.5–2.5)
MCH RBC QN AUTO: 30.4 PG (ref 27–33)
MCHC RBC AUTO-ENTMCNC: 33 G/DL (ref 33.6–35)
MCV RBC AUTO: 92 FL (ref 81.4–97.8)
MONOCYTES # BLD AUTO: 0.83 K/UL (ref 0–0.85)
MONOCYTES NFR BLD AUTO: 7.2 % (ref 0–13.4)
NEUTROPHILS # BLD AUTO: 9.71 K/UL (ref 2–7.15)
NEUTROPHILS NFR BLD: 84.1 % (ref 44–72)
NRBC # BLD AUTO: 0 K/UL
NRBC BLD-RTO: 0 /100 WBC
PLATELET # BLD AUTO: 173 K/UL (ref 164–446)
PMV BLD AUTO: 10.3 FL (ref 9–12.9)
POTASSIUM SERPL-SCNC: 3.7 MMOL/L (ref 3.6–5.5)
RBC # BLD AUTO: 3.62 M/UL (ref 4.2–5.4)
SODIUM SERPL-SCNC: 136 MMOL/L (ref 135–145)
TRIGL SERPL-MCNC: 49 MG/DL (ref 0–149)
TROPONIN T SERPL-MCNC: 181 NG/L (ref 6–19)
TROPONIN T SERPL-MCNC: 189 NG/L (ref 6–19)
WBC # BLD AUTO: 11.6 K/UL (ref 4.8–10.8)

## 2021-06-13 PROCEDURE — 700105 HCHG RX REV CODE 258: Performed by: INTERNAL MEDICINE

## 2021-06-13 PROCEDURE — 83735 ASSAY OF MAGNESIUM: CPT

## 2021-06-13 PROCEDURE — 70551 MRI BRAIN STEM W/O DYE: CPT | Mod: MG

## 2021-06-13 PROCEDURE — 92610 EVALUATE SWALLOWING FUNCTION: CPT

## 2021-06-13 PROCEDURE — 80061 LIPID PANEL: CPT

## 2021-06-13 PROCEDURE — 71045 X-RAY EXAM CHEST 1 VIEW: CPT

## 2021-06-13 PROCEDURE — 84484 ASSAY OF TROPONIN QUANT: CPT | Mod: 91

## 2021-06-13 PROCEDURE — 99233 SBSQ HOSP IP/OBS HIGH 50: CPT | Performed by: PSYCHIATRY & NEUROLOGY

## 2021-06-13 PROCEDURE — 770022 HCHG ROOM/CARE - ICU (200)

## 2021-06-13 PROCEDURE — 700111 HCHG RX REV CODE 636 W/ 250 OVERRIDE (IP): Performed by: INTERNAL MEDICINE

## 2021-06-13 PROCEDURE — 99291 CRITICAL CARE FIRST HOUR: CPT | Performed by: NURSE PRACTITIONER

## 2021-06-13 PROCEDURE — 85025 COMPLETE CBC W/AUTO DIFF WBC: CPT

## 2021-06-13 PROCEDURE — 80048 BASIC METABOLIC PNL TOTAL CA: CPT

## 2021-06-13 RX ORDER — LABETALOL HYDROCHLORIDE 5 MG/ML
10 INJECTION, SOLUTION INTRAVENOUS
Status: DISCONTINUED | OUTPATIENT
Start: 2021-06-13 | End: 2021-06-16

## 2021-06-13 RX ORDER — HYDRALAZINE HYDROCHLORIDE 20 MG/ML
10 INJECTION INTRAMUSCULAR; INTRAVENOUS
Status: DISCONTINUED | OUTPATIENT
Start: 2021-06-13 | End: 2021-06-13

## 2021-06-13 RX ORDER — PHENYLEPHRINE HCL IN 0.9% NACL 0.5 MG/5ML
SYRINGE (ML) INTRAVENOUS
Status: ACTIVE
Start: 2021-06-13 | End: 2021-06-13

## 2021-06-13 RX ORDER — HYDRALAZINE HYDROCHLORIDE 20 MG/ML
10 INJECTION INTRAMUSCULAR; INTRAVENOUS
Status: DISCONTINUED | OUTPATIENT
Start: 2021-06-13 | End: 2021-06-16

## 2021-06-13 RX ORDER — LABETALOL HYDROCHLORIDE 5 MG/ML
10 INJECTION, SOLUTION INTRAVENOUS
Status: DISCONTINUED | OUTPATIENT
Start: 2021-06-13 | End: 2021-06-13

## 2021-06-13 RX ADMIN — SODIUM CHLORIDE, POTASSIUM CHLORIDE, SODIUM LACTATE AND CALCIUM CHLORIDE: 600; 310; 30; 20 INJECTION, SOLUTION INTRAVENOUS at 19:08

## 2021-06-13 RX ADMIN — Medication 100 MCG: at 00:44

## 2021-06-13 RX ADMIN — Medication 100 MCG: at 01:15

## 2021-06-13 RX ADMIN — PHENYLEPHRINE HYDROCHLORIDE 20 MCG/MIN: 10 INJECTION INTRAVENOUS at 02:37

## 2021-06-13 ASSESSMENT — ENCOUNTER SYMPTOMS
FOCAL WEAKNESS: 1
SHORTNESS OF BREATH: 0
NAUSEA: 0

## 2021-06-13 ASSESSMENT — FIBROSIS 4 INDEX: FIB4 SCORE: 4.56

## 2021-06-13 ASSESSMENT — PAIN DESCRIPTION - PAIN TYPE
TYPE: ACUTE PAIN
TYPE: ACUTE PAIN

## 2021-06-13 ASSESSMENT — PATIENT HEALTH QUESTIONNAIRE - PHQ9
1. LITTLE INTEREST OR PLEASURE IN DOING THINGS: NOT AT ALL
2. FEELING DOWN, DEPRESSED, IRRITABLE, OR HOPELESS: NOT AT ALL
SUM OF ALL RESPONSES TO PHQ9 QUESTIONS 1 AND 2: 0

## 2021-06-13 NOTE — ASSESSMENT & PLAN NOTE
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

## 2021-06-13 NOTE — PROGRESS NOTES
IR Nursing Note:    Cerebral angiogram with mechanical thrombectomy by Dr. Smith assisted by RT Frank, right groin access site CDI with gauze and a tegaderm dressing. Pre-procedure left post-tib auscultated with doppler right pedal pulse auscultated with doppler, penumbra system was used to retrieve clot.   ?  Patient tolerated procedure, Vital signs were taken every 5 minutes and remained within parameters (see doc flow sheets) ;hemostasis achieved using an AngioSeal vascular closure device deployed at 2156; report given to RN Patriciai; patient transported to ICU with RN and monitor     TICI score 2b  Procedure stop time- 2156     Post procedure pedal pulses auscultated left post tib, right pedal    TERUMO Angio-Seal VIP 8F  REF# [id number] LOT# [id number] EXP. 03/31/2022

## 2021-06-13 NOTE — H&P
Hospital Medicine History & Physical Note    Date of Service  6/12/2021    Primary Care Physician  No primary care provider on file.    Consultants  Neurology   Neuro IR   MICU     Code Status  Full Code    Chief Complaint  Chief Complaint   Patient presents with   • Possible Stroke     Pt presents to the ED after family called [facility]; per family pt was speaking clearly on the phone this morning at around [id number]. Pt normall lives alone and is independent. HX and medication list unknown.        History of Presenting Illness  89 y.o. female with HTN and cerebral aneurysm who presented 6/12/2021 with being found down on the ground.  Patient is able to function independently without a walker.  Last seen normal at around 11 AM today when on the phone with a friend.  At around 530, patient was found in her home altered with neurologic deficits. 911 was called, and patient was taken to ER.     In the ED, patient found to have normal vital signs. Remarkable labs include neutrophilic leukocytosis, normocytic anemia. CT head shows hyperdense right MCA with right temporal and inferior frontoparietal lobe edema with slight loss of gray-white differentitation consistent infarct with subtle area of increased attenuation at the junction of the right frontoparietal lobe that could represent some petechial hemorrhage. CT angio head with the right middle cerebral artery M1 occlusion. Patient will be admitted to MICU. Neuro IR was consulted.       Review of Systems  ROS  Unable to assess due to acuity of condition    Past Medical History   has a past medical history of Aneurysm ([facility]) (2000) and SBO (small bowel obstruction) (Former[facility]) (2013).    Surgical History  No pertinent surgical history     Family History  No pertinent family history      Social History   Unknown    Allergies  Unable to attain at this time     Medications  Prior to Admission Medications   Prescriptions Last Dose Informant Patient Reported? Taking?   NON SPECIFIED  unknown at unknown Family Member Yes Yes   Sig: Take 1 tablet by mouth 2 times a day. unknown   alendronate (FOSAMAX) 70 MG Tab unknown at unknown Family Member Yes Yes   Sig: Take 70 mg by mouth every 7 days.   furosemide (LASIX) 20 MG Tab unknown at unknown Family Member Yes Yes   Sig: Take 20 mg by mouth every day.   potassium chloride SA (KDUR) 20 MEQ Tab CR unknown at unknown Family Member Yes Yes   Sig: Take 20 mEq by mouth every day.      Facility-Administered Medications: None       Physical Exam  Temp:  [37.2 °C (98.9 °F)] 37.2 °C (98.9 °F)  Pulse:  [81] 81  Resp:  [16-39] 39  BP: (163-176)/(71-74) 163/71  SpO2:  [93 %] 93 %    Physical Exam  Constitutional:       Appearance: Normal appearance. She is normal weight.   HENT:      Head: Normocephalic.      Mouth/Throat:      Mouth: Mucous membranes are moist.   Eyes:      Pupils: Pupils are equal, round, and reactive to light.   Neck:      Comments: Unable to assess   Cardiovascular:      Rate and Rhythm: Normal rate and regular rhythm.      Pulses: Normal pulses.      Comments: Systolic ejection murmur with radiation to carotids  Pulmonary:      Effort: Pulmonary effort is normal.      Breath sounds: Normal breath sounds.   Abdominal:      General: Abdomen is flat. Bowel sounds are normal.      Palpations: Abdomen is soft.   Musculoskeletal:         General: No swelling.   Neurological:      Cranial Nerves: Cranial nerve deficit present.      Comments: Follows simple commands  Left sided facial droop   Dysphasic  Strength 0/5 in LUE and LLE   Strength 3/5 in RUE and RLE          Laboratory:  Recent Labs     06/12/21  1831   WBC 14.3*   RBC 3.63*   HEMOGLOBIN 10.9*   HEMATOCRIT 33.8*   MCV 93.1   MCH 30.0   MCHC 32.2*   RDW 53.3*   PLATELETCT 145*   MPV 10.8     Recent Labs     06/12/21  1831   SODIUM 138   POTASSIUM 3.8   CHLORIDE 106   CO2 20   GLUCOSE 99   BUN 20   CREATININE 0.84   CALCIUM 8.2*     Recent Labs     06/12/21  1831   ALTSGPT 14   ASTSGOT 25    ALKPHOSPHAT 61   TBILIRUBIN 0.8   GLUCOSE 99     Recent Labs     06/12/21  1831   APTT 35.9   INR 1.28*     No results for input(s): NTPROBNP in the last 72 hours.      Recent Labs     06/12/21  1831   TROPONINT 141*       Imaging:  CT-CTA NECK WITH & W/O-POST PROCESSING   Final Result      1.  Mild calcified plaque in the left carotid bulb and proximal left internal carotid artery with less than 50% stenosis.      2.  Soft tissue density in the mediastinum and hilum could represent lymphadenopathy which is incompletely visualized/evaluated on this neck CTA. Recommend follow-up chest CT.      CT-CTA HEAD WITH & W/O-POST PROCESS   Final Result      Right middle cerebral artery M1 occlusion.      Neuro IR is aware of findings.         CT-CEREBRAL PERFUSION ANALYSIS   Final Result      1.  Cerebral blood flow less than 30% likely representing completed infarct = 0 mL.      2.  T Max more than 6 seconds likely representing combination of completed infarct and ischemia = 105 mL.      3.  Mismatched volume likely representing ischemic brain/penumbra = 105 ml      4.  Please note that the cerebral perfusion was performed on the limited brain tissue around the basal ganglia region. Infarct/ischemia outside the CT perfusion sections can be missed in this study.      CT-HEAD W/O   Final Result      1.  Hyperdense right MCA with right temporal and inferior frontoparietal lobe edema with slight loss of gray-white matter differentiation consistent with infarct with subtle area of increased attenuation at the junction of the right frontoparietal lobe    could represent some petechial hemorrhage.      2.  This was discussed with Dr. Baker at 6:55 PM who had already talked to neurology and the neurointerventionist on call.      DX-CHEST-PORTABLE (1 VIEW)    (Results Pending)   IR-THROMBO MECHANICAL ARTERY,INIT    (Results Pending)   EC-ECHOCARDIOGRAM COMPLETE W/O CONT    (Results Pending)         Assessment/Plan:  I  anticipate this patient will require at least two midnights for appropriate medical management, necessitating inpatient admission.    * Acute right MCA stroke (HCC)  Assessment & Plan  Patient noted to have Right sided M1 occlusion, admit to MICU  Going for thrombectomy, follow OR note  ASA 81 mg po daily Lipitor 40 mg po qhs   Neuro check Q1H  Permissive HTN, avoid lowering BP > 25% in first 24 hours    Aortic stenosis  Assessment & Plan  Reports hx of aortic stenosis, monitor cardiac output especially during thrombectomy  TTE

## 2021-06-13 NOTE — ASSESSMENT & PLAN NOTE
Uncontrolled  Start Norvasc 5mg   Has not required PRN coverage/24hrs  Neurology is recommending neuro blood pressure control 120 - 180

## 2021-06-13 NOTE — PROGRESS NOTES
PT has a brain MRI ordered. PT is not a/o x4 to complete MRI safety screening sheet, so it was completed with granddaughter. Granddaughter knew PT had an aneurysm in the past but was unsure if a clip had been placed. Radiologist Dr. Zaragoza cleared PT for MRI using a head CT, no aneurysm clips or coils were visualized.

## 2021-06-13 NOTE — ED TRIAGE NOTES
Chief Complaint   Patient presents with   • Possible Stroke     Pt presents to the ED after family called [facility]; per family pt was speaking clearly on the phone this morning at around [id number]. Pt normall lives alone and is independent. HX and medication list unknown.      Pt emergently brought to CT for possible stroke with RN on arrival ; Pt roomed to RD 11; report given to Charley Shaffer CT    Neurologist provided NIH score of 15

## 2021-06-13 NOTE — PROGRESS NOTES
Critical Care Progress Note    Date of admission  6/12/2021    Chief Complaint  Stroke    Hospital Course  Ms. Bingham is a 89-year-old female with PMH significant for HTN and aortic stenosis who was admitted 6/12/2021 for right M1 occlusion status post thrombectomy with TICI 2A/2B, also noted 8 mm right MCA bifurcation aneurysm.    Interval Problem Update  Reviewed last 24 hour events:              - acute events overnight: none              - Tm:  98.9              - HR: 70s to 80s              - SBP:  120s to 160s  -phenylephrine 15 mics              - Neuro: Drowsy, arouses to voice.  Disoriented to time.  RUE/RLE 3-4/5.  LUE/LLE 1/5.  Left facial droop   - Pulm: 2L NC              - GI: NPO Pending SLP              - I/O: 250/900              - Barney:  Yes              - Lines:  Barney              - PPx: Not indicated              - CXR (personally reviewed):  None today              - Antibiotic Day:  None              - SAT/SBT:  Not applicable              - Mobility:  2    Review of Systems  Review of Systems   Unable to perform ROS: Mental acuity (Limited)   Respiratory: Negative for shortness of breath.    Cardiovascular: Negative for chest pain.   Gastrointestinal: Negative for nausea.   Neurological: Positive for focal weakness.        Vital Signs for last 24 hours   Temp:  [36.2 °C (97.2 °F)-37.2 °C (98.9 °F)] 36.6 °C (97.9 °F)  Pulse:  [50-89] 84  Resp:  [8-48] 28  BP: (119-218)/(55-85) 150/69  SpO2:  [88 %-100 %] 94 %    Hemodynamic parameters for last 24 hours       Respiratory Information for the last 24 hours       Physical Exam   Physical Exam  Vitals and nursing note reviewed.   Constitutional:       Appearance: She is underweight. She is not ill-appearing.      Interventions: Nasal cannula in place.      Comments: Thin/frail   HENT:      Head: Normocephalic.      Right Ear: Hearing normal.      Left Ear: Hearing normal.      Nose: Nose normal.      Mouth/Throat:      Lips: Pink.      Mouth:  Mucous membranes are moist.   Eyes:      General: Visual field deficit present.   Cardiovascular:      Rate and Rhythm: Rhythm irregularly irregular.      Pulses: Normal pulses.      Heart sounds: Murmur heard.     Pulmonary:      Effort: Pulmonary effort is normal.      Breath sounds: Normal breath sounds. No wheezing.   Abdominal:      General: Bowel sounds are normal.      Palpations: Abdomen is soft.      Tenderness: There is no abdominal tenderness.   Musculoskeletal:      Right lower leg: No edema.      Left lower leg: No edema.      Comments: RUE/RLE 3-4/5  LUE/LLE 1/5   Skin:     Coloration: Skin is pale.   Neurological:      Mental Status: She is lethargic and disoriented.      GCS: GCS eye subscore is 3. GCS verbal subscore is 4. GCS motor subscore is 6.      Cranial Nerves: Facial asymmetry present.      Motor: Weakness, atrophy and abnormal muscle tone present.      Coordination: Coordination abnormal. Heel to Uriarte Test abnormal.   Psychiatric:         Speech: Speech normal.         Behavior: Behavior is cooperative.         Cognition and Memory: Cognition is impaired. Memory is impaired.         Judgment: Judgment normal.         Medications  Current Facility-Administered Medications   Medication Dose Route Frequency Provider Last Rate Last Admin   • PHENYLEPHRINE HCL-NACL 1-0.9 MG/10ML-% IV SOSY            • phenylephrine (LIONEL-SYNEPHRINE) 40 mg in  mL Infusion  0-300 mcg/min Intravenous Continuous Jeremy M Gonda, M.D. 5.6 mL/hr at 06/13/21 1100 15 mcg/min at 06/13/21 1100   • atorvastatin (LIPITOR) tablet 40 mg  40 mg Oral Q EVENING Cullen Yang M.D.       • niCARdipine (CARDENE) 25 mg in  mL Infusion  0-15 mg/hr Intravenous Continuous Jonas Coppola, D.O.   Dose not Required at 06/13/21 0032   • [Held by provider] aspirin (ASA) chewable tab 81 mg  81 mg Oral DAILY Cullen Yang M.D.       • hydrALAZINE (APRESOLINE) injection 10 mg  10 mg Intravenous Q2HRS PRN Jonas Coppola, D.O.    "10 mg at 06/12/21 2247   • labetalol (NORMODYNE/TRANDATE) injection 10 mg  10 mg Intravenous Q10 MIN PRN JONY CramerO.       • lactated ringers infusion   Intravenous Continuous Jonas Coppola D.O. 50 mL/hr at 06/13/21 0244 Rate Verify at 06/13/21 0244       Fluids    Intake/Output Summary (Last 24 hours) at 6/13/2021 1107  Last data filed at 6/13/2021 1000  Gross per 24 hour   Intake 569.67 ml   Output 1170 ml   Net -600.33 ml       Laboratory          Recent Labs     06/12/21  1831 06/13/21  1014   SODIUM 138 136   POTASSIUM 3.8 3.7   CHLORIDE 106 106   CO2 20 19*   BUN 20 17   CREATININE 0.84 0.78   MAGNESIUM  --  1.5   CALCIUM 8.2* 8.4*     Recent Labs     06/12/21  1831 06/13/21  1014   ALTSGPT 14  --    ASTSGOT 25  --    ALKPHOSPHAT 61  --    TBILIRUBIN 0.8  --    GLUCOSE 99 111*     Recent Labs     06/12/21  1831 06/13/21  1014   WBC 14.3* 11.6*   NEUTSPOLYS 87.70* 84.10*   LYMPHOCYTES 6.80* 7.90*   MONOCYTES 4.80 7.20   EOSINOPHILS 0.00 0.00   BASOPHILS 0.10 0.40   ASTSGOT 25  --    ALTSGPT 14  --    ALKPHOSPHAT 61  --    TBILIRUBIN 0.8  --      Recent Labs     06/12/21  1831 06/13/21  1014   RBC 3.63* 3.62*   HEMOGLOBIN 10.9* 11.0*   HEMATOCRIT 33.8* 33.3*   PLATELETCT 145* 173   PROTHROMBTM 15.7*  --    APTT 35.9  --    INR 1.28*  --        Imaging  EKG:  I have personally reviewed the images and compared with prior images.  CT:    Reviewed    Assessment/Plan  * Acute right MCA stroke (HCC)- (present on admission)  Assessment & Plan  -Status post mechanical thrombectomy with TICI score 2A/2B  -Right M1 occlusion with right MCA bifurcation aneurysm 8 mm  -Goal -180  -Every 2 hour neurochecks  -Maintain normothermia, normoglycemia, normal natremia  -PT OT SLP  -no antithrombotic therapy pending MRI results, anticipate starting plavix 75 mg daily therapy  -per Neurology \"may defer statin therapy given advance age and LDL at goal < 70\"  -Palliative care consult    Age-related physical " debility  Assessment & Plan  -Body mass index is 17.57 kg/m².   -dietary consult  -encourage PO intake  -now with acute CVA s/p thrombectomy  -PT OT SLP  -Avoid narcotics, benzos and hypnotics  -Fall precautions    Osteoporosis  Assessment & Plan  -On Fosamax as outpatient    Hypertension- (present on admission)  Assessment & Plan  -History of  -Currently holding her home meds  -Goal -180 post thrombectomy  -As needed antihypertensives with parameters    Aortic stenosis- (present on admission)  Assessment & Plan  -History of         VTE:  Contraindicated  Ulcer: Not Indicated  Lines: Barney Catheter  Ongoing indication addressed    I have performed a physical exam and reviewed and updated ROS and Plan today (6/13/2021). In review of yesterday's note (6/12/2021), there are no changes except as documented above.     Discussed patient condition and risk of morbidity and/or mortality with Family, RN, Pharmacy, Charge nurse / hot rounds, Patient and neurology  The patient remains critically ill.  Critical care time = 38 minutes in directly providing and coordinating critical care and extensive data review.  No time overlap and excludes procedures.    Please note that this dictation was created using voice recognition software. I have made every reasonable attempt to correct obvious errors, but there may be errors of grammar and possibly content that I did not discover before finalizing the note.    BOSTON Marie.

## 2021-06-13 NOTE — PROCEDURES
Procedure: IR mechanical thrombectomy of right MCA occlusion.    Access: Rt CFA 8 fr sheath closed with Angioseal    Rt Radial artery access, sutured in place.    Findings:      Right M1 occlusion. Suction thrombectomy performed with Tici 2A/2B recanalization achieved.  Also noted is an 8 mm right MCA bifurcation aneurysm.    Full report to follow.    Plan:    To ICU  Monitor overnight with CT in the AM  Discussed with Neurology, ICU and patients family.

## 2021-06-13 NOTE — ASSESSMENT & PLAN NOTE
Patient noted to have Right sided M1 occlusion  Status post mechanical thrombectomy on 6/12  Lipitor 40 mg po qhs   Neuro check Q4   plavix  PT OT eval  Plan rehab DC  Not doing an agressive work up given pt's advanced age

## 2021-06-13 NOTE — ASSESSMENT & PLAN NOTE
-History of  -Currently holding her home meds  -Goal -180 post thrombectomy  -As needed antihypertensives with parameters

## 2021-06-13 NOTE — PROGRESS NOTES
Update neuro note.      TICI 2b.  Given the aortic stenosis rec to keep pressure 160-180 systolic.     Plan on repeat imaging in the morning.  Sooner if change in neuro status.       Tong Oswald M.D.

## 2021-06-13 NOTE — PROGRESS NOTES
"Neurology Progress Note  Neurohospitalist Service, [facility]    Referring Physician: Jonas Coppola D.O.      Interval History:  89F high functioning woman presenting with R MCA syndrome, with presence of R M1 occlusion with favorable perfusion profile.  Taken to cath lab with Dr. Pierre for endovascular clot retrieval- TICI 2A/2B reperfusion attained.  Has a 8mm R MCA bifurcation aneurysm.  Post operative exam improved.    Review of systems: In addition to what is detailed in the HPI and/or updated in the interval history, all other systems reviewed and are negative.    Past Medical History, Past Surgical History and Social History reviewed and unchanged from prior    Medications:    Current Facility-Administered Medications:   •  PHENYLEPHRINE HCL-NACL 1-0.9 MG/10ML-% IV SOSY, , , ,   •  phenylephrine (LIONEL-SYNEPHRINE) 40 mg in  mL Infusion, 0-300 mcg/min, Intravenous, Continuous, Jeremy M Gonda, M.D., Last Rate: 7.5 mL/hr at 06/13/21 0530, 20 mcg/min at 06/13/21 0530  •  atorvastatin (LIPITOR) tablet 40 mg, 40 mg, Oral, Q EVENING, Cullen Yang M.D.  •  niCARdipine (CARDENE) 25 mg in  mL Infusion, 0-15 mg/hr, Intravenous, Continuous, Jonas Coppola D.O., Dose not Required at 06/13/21 0032  •  [Held by provider] aspirin (ASA) chewable tab 81 mg, 81 mg, Oral, DAILY, Cullen Yang M.D.  •  HYDRALAZINE HCL 20 MG/ML INJ SOLN, , , ,   •  hydrALAZINE (APRESOLINE) injection 10 mg, 10 mg, Intravenous, Q2HRS PRN, Jonas Coppola, D.O., 10 mg at 06/12/21 2247  •  labetalol (NORMODYNE/TRANDATE) injection 10 mg, 10 mg, Intravenous, Q10 MIN PRN, Jonas Coppola, D.O.  •  lactated ringers infusion, , Intravenous, Continuous, Jonas Coppola, D.O., Last Rate: 50 mL/hr at 06/13/21 0244, Rate Verify at 06/13/21 0244    Physical Examination:   BP (!) 164/70   Pulse 88   Temp 36.5 °C (97.7 °F) (Temporal)   Resp (!) 46   Ht 1.651 m (5' 5\")   Wt 47.9 kg (105 lb 9.6 oz)   SpO2 94%   BMI " 17.57 kg/m²       General: Patient is sleepy but easily arousable  Neck: There is normal range of motion  CV: Regular rate   Extremities:  Warm, dry, and intact, without peripheral lower extremity edema    NEUROLOGICAL EXAM:     Mental status: Sleepy, easily arousable, alert and fully oriented, follows commands  Speech and language: Speech is clear and fluent. The patient is able to name and repeat.  Cranial nerve exam: Visual fields are full. There is no nystagmus. Extraocular muscles are intact. Face appears symmetric. Tongue is midline.  Motor exam: RUE/RLE are sustained antigravity.  LUE is antigravity with drift to bed- requires constant encouragement to get antigravity.  LLE with antigravity strength with slight drift.  Sensory exam:  Reacts to tactile in all 4 extremities, there is neglect to double stim on L  Coordination: No ataxia on RUE FTN testing      NIHSS: National Institutes of Health Stroke Scale    [1] 1a:Level of Consciousness    0-alert 1-drowsy   2-stupor   3-coma  [0] 1b:LOC Questions                  0-both  1-one      2-neither  [0] 1c:LOC Commands                   0-both  1-one      2-neither  [0] 2: Best Gaze                     0-nl    1-partial  2-forced  [0] 3: Visual Fields                   0-nl    1-partial  2-complete 3-bilat  [0] 4: Facial Paresis                0-nl    1-minor    2-partial  3-full  MOTOR                       0-nl  [0] 5: Right Arm           1-drift  [2] 6: Left Arm             2-some effort vs gravity  [0] 7: Right Leg           3-no effort vs gravity  [1] 8: Left Leg             4-no movement                             x-untestable  [0] 9: Limb Ataxia                    0-abs   1-1_limb   2-2+_limbs       x-untestable  [0] 10:Sensory                        0-nl    1-partial  2-dense  [0] 11:Best Language/Aphasia         0-nl    1-mild/mod 2-severe   3-mute  [0] 12:Dysarthria                     0-nl    1-mild/mod 2-severe       x-untestable  [1]  13:Neglect/Inattention            0-none  1-partial  2-complete  [5] TOTAL      Objective Data:    Labs:  Lab Results   Component Value Date/Time    PROTHROMBTM 15.7 (H) 06/12/2021 06:31 PM    INR 1.28 (H) 06/12/2021 06:31 PM      Lab Results   Component Value Date/Time    WBC 14.3 (H) 06/12/2021 06:31 PM    RBC 3.63 (L) 06/12/2021 06:31 PM    HEMOGLOBIN 10.9 (L) 06/12/2021 06:31 PM    HEMATOCRIT 33.8 (L) 06/12/2021 06:31 PM    MCV 93.1 06/12/2021 06:31 PM    MCH 30.0 06/12/2021 06:31 PM    MCHC 32.2 (L) 06/12/2021 06:31 PM    MPV 10.8 06/12/2021 06:31 PM    NEUTSPOLYS 87.70 (H) 06/12/2021 06:31 PM    LYMPHOCYTES 6.80 (L) 06/12/2021 06:31 PM    MONOCYTES 4.80 06/12/2021 06:31 PM    EOSINOPHILS 0.00 06/12/2021 06:31 PM    BASOPHILS 0.10 06/12/2021 06:31 PM      Lab Results   Component Value Date/Time    SODIUM 138 06/12/2021 06:31 PM    POTASSIUM 3.8 06/12/2021 06:31 PM    CHLORIDE 106 06/12/2021 06:31 PM    CO2 20 06/12/2021 06:31 PM    GLUCOSE 99 06/12/2021 06:31 PM    BUN 20 06/12/2021 06:31 PM    CREATININE 0.84 06/12/2021 06:31 PM      Lab Results   Component Value Date/Time    CHOLSTRLTOT 127 06/13/2021 01:35 AM    LDL 69 06/13/2021 01:35 AM    HDL 48 06/13/2021 01:35 AM    TRIGLYCERIDE 49 06/13/2021 01:35 AM       Lab Results   Component Value Date/Time    ALKPHOSPHAT 61 06/12/2021 06:31 PM    ASTSGOT 25 06/12/2021 06:31 PM    ALTSGPT 14 06/12/2021 06:31 PM    TBILIRUBIN 0.8 06/12/2021 06:31 PM        Imaging/Testing:    I interpreted and/or reviewed the patient's neuroimaging    DX-CHEST-PORTABLE (1 VIEW)   Final Result      Cardiomegaly. No focal consolidation or pleural effusions.      CT-CTA NECK WITH & W/O-POST PROCESSING   Final Result      1.  Mild calcified plaque in the left carotid bulb and proximal left internal carotid artery with less than 50% stenosis.      2.  Soft tissue density in the mediastinum and hilum could represent lymphadenopathy which is incompletely visualized/evaluated on this neck  CTA. Recommend follow-up chest CT.      CT-CTA HEAD WITH & W/O-POST PROCESS   Final Result      Right middle cerebral artery M1 occlusion.      Neuro IR is aware of findings.         CT-CEREBRAL PERFUSION ANALYSIS   Final Result      1.  Cerebral blood flow less than 30% likely representing completed infarct = 0 mL.      2.  T Max more than 6 seconds likely representing combination of completed infarct and ischemia = 105 mL.      3.  Mismatched volume likely representing ischemic brain/penumbra = 105 ml      4.  Please note that the cerebral perfusion was performed on the limited brain tissue around the basal ganglia region. Infarct/ischemia outside the CT perfusion sections can be missed in this study.      CT-HEAD W/O   Final Result      1.  Hyperdense right MCA with right temporal and inferior frontoparietal lobe edema with slight loss of gray-white matter differentiation consistent with infarct with subtle area of increased attenuation at the junction of the right frontoparietal lobe    could represent some petechial hemorrhage.      2.  This was discussed with Dr. Baker at 6:55 PM who had already talked to neurology and the neurointerventionist on call.      IR-THROMBO MECHANICAL ARTERY,INIT    (Results Pending)   EC-ECHOCARDIOGRAM COMPLETE W/O CONT    (Results Pending)   CT-HEAD W/O    (Results Pending)   MR-BRAIN-W/O    (Results Pending)   MR-BRAIN-W/O    (Results Pending)   MR-BRAIN-W/O    (Results Pending)       Assessment and Plan:  Ángel Jordan is a 89 year old woman presenting with L side weakness, slurred speech, found to have R M1 occlusion, not a candidate for tPA as outside time window, but did undergo endovascular clot retrieval with TICI 2B reperfusion.  Post-op BP goal 160-180 given history of aortic stenosis, and to mitigate reperfusion injury.  Overall exam this AM improved, however with residual L side weakness and neglect.  Awaiting MRI results to initiate antithrombotic therapy.   She states she was reliably taking daily ASA.  Will transition to plavix.  Will continue with liberal BP goal given her aortic stenosis.    Problem list:  1.  R MCA stroke s/p endovascular clot retrieval of R M1 with TICI 2B reperfusion  2.  Hypertension  3.  Aortic stenosis  4.  R MCA 8mm saccular aneurysm    Plan:   - neurochecks/NIHSS per post-thrombectomy protocol   - expedite MRI brain without contrast this AM, does not need to be at 24 hours    - no antithrombotic therapy pending MRI results, anticipate starting plavix 75 mg daily therapy   - stroke labs:  HgbA1c and LDL 69   - may defer statin therapy given advance age and LDL at goal < 70   - ok with long-term, liberal BP goal given advance age and history of aortic stenosis- acutely may aim for SBP goal 140-180   - PT/OT/SLP ok even with first 24 hours post-op   - SCDs only for now for DVT ppx- may start lovenox SQ once MRI results attained and there is no significant hemorrhagic conversion   - will need GOC/palliative consult if future care, and long-term care planning if deficits prevent her from returning to independent living   - if within GOC, consider TTE and long-term cardiac monitoring to complete embolic workup   - for 8mm R MCA aneurysm- no treatment or surveillance indicated given advance age      The evaluation of the patient, and recommended management, was discussed with the ICU team. I have performed a physical exam and reviewed and updated ROS and Plan today (6/13/2021).     Tong Cai MD  Neurohospitalist, Acute Care Services

## 2021-06-13 NOTE — ED PROVIDER NOTES
ED Provider Note    Scribed for Jonnie Baker M.D. by Melo Kruger. 6/12/2021, 6:55 PM.    Primary care provider: No primary care provider noted.  Means of arrival: EMS  History obtained from: EMS  History limited by: Altered mental status    CHIEF COMPLAINT  Chief Complaint   Patient presents with    Possible Stroke     Pt presents to the ED after family called [facility]; per family pt was speaking clearly on the phone this morning at around [id number]. Pt normall lives alone and is independent. HX and medication list unknown.        HPI  Ángel Jordan is a 89 y.o. female who presents to the Emergency Department for a possible stroke. The patient was last normal while talking on the phone to her family at about 10:00 AM this morning. Before that, she was last seen in person about a day or two ago. Family reports the patient sounded fine on the phone if a little bit tired. EMS reports the patient was found in her home by a family friend. The patient lives alone and does not need any assistance walking at baseline. Patient was able to report to EMS when they arrived that she takes some medications for hypertension but did not report any other medical history. On EMS exam the patient had complete left sided deficits but would follow commands.    History limited secondary to patient's altered mental status.    PPE Note: I personally donned full PPE for all patient encounters during this visit, including being clean-shaven with an N95 respirator mask, gloves, and goggles.     Scribe remained outside the patient's room and did not have any contact with the patient for the duration of patient encounter.      REVIEW OF SYSTEMS  Pertinent positives include left sided deficits. See HPI for details. Further ROS unobtainable secondary to patient's altered mental status.    PAST MEDICAL HISTORY   has a past medical history of Aneurysm ([facility]) (2000) and SBO (small bowel obstruction) ([facility]) (2013).    SURGICAL  "HISTORY  patient denies any surgical history    SOCIAL HISTORY  Social History     Tobacco Use    Smoking status: None noted   Substance Use Topics    Alcohol use: None noted    Drug use: None noted      Social History     Substance and Sexual Activity   Drug Use None noted       FAMILY HISTORY  History reviewed. No pertinent family history.    CURRENT MEDICATIONS  No current outpatient medications     ALLERGIES  Not on File    PHYSICAL EXAM  VITAL SIGNS: /74   Pulse 81   Temp 37.2 °C (98.9 °F)   Resp 16   Ht 1.651 m (5' 5\")   Wt 49.9 kg (110 lb)   SpO2 93%   BMI 18.30 kg/m²     Constitutional: Well developed, well nourished.   HENT: Normocephalic, Atraumatic, mask in place.  Eyes: Conjunctiva normal, No discharge.   Cardiovascular: Normal heart rate, Normal rhythm, No murmurs, equal pulses.   Pulmonary: Normal breath sounds, No respiratory distress, No wheezing, No rales, No rhonchi.  Chest: No chest wall tenderness or deformity.   Abdomen:Soft, No tenderness, No masses, no rebound, no guarding.   Musculoskeletal: No major deformities noted, No tenderness.   Skin: Warm, Dry, No erythema, No rash.   Neurologic: Right eye deviation her eyes will not pass midline. Total flaccidity to left upper extremity. Drift with left lower extremity. Inattention to the left side.  NIH score of 15  Psychiatric: As above.     LABS  Results for orders placed or performed during the hospital encounter of 06/12/21   CBC WITH DIFFERENTIAL   Result Value Ref Range    WBC 14.3 (H) 4.8 - 10.8 K/uL    RBC 3.63 (L) 4.20 - 5.40 M/uL    Hemoglobin 10.9 (L) 12.0 - 16.0 g/dL    Hematocrit 33.8 (L) 37.0 - 47.0 %    MCV 93.1 81.4 - 97.8 fL    MCH 30.0 27.0 - 33.0 pg    MCHC 32.2 (L) 33.6 - 35.0 g/dL    RDW 53.3 (H) 35.9 - 50.0 fL    Platelet Count 145 (L) 164 - 446 K/uL    MPV 10.8 9.0 - 12.9 fL    Neutrophils-Polys 87.70 (H) 44.00 - 72.00 %    Lymphocytes 6.80 (L) 22.00 - 41.00 %    Monocytes 4.80 0.00 - 13.40 %    Eosinophils 0.00 " 0.00 - 6.90 %    Basophils 0.10 0.00 - 1.80 %    Immature Granulocytes 0.60 0.00 - 0.90 %    Nucleated RBC 0.00 /100 WBC    Neutrophils (Absolute) 12.52 (H) 2.00 - 7.15 K/uL    Lymphs (Absolute) 0.97 (L) 1.00 - 4.80 K/uL    Monos (Absolute) 0.69 0.00 - 0.85 K/uL    Eos (Absolute) 0.00 0.00 - 0.51 K/uL    Baso (Absolute) 0.02 0.00 - 0.12 K/uL    Immature Granulocytes (abs) 0.08 0.00 - 0.11 K/uL    NRBC (Absolute) 0.00 K/uL   COMP METABOLIC PANEL   Result Value Ref Range    Sodium 138 135 - 145 mmol/L    Potassium 3.8 3.6 - 5.5 mmol/L    Chloride 106 96 - 112 mmol/L    Co2 20 20 - 33 mmol/L    Anion Gap 12.0 7.0 - 16.0    Glucose 99 65 - 99 mg/dL    Bun 20 8 - 22 mg/dL    Creatinine 0.84 0.50 - 1.40 mg/dL    Calcium 8.2 (L) 8.5 - 10.5 mg/dL    AST(SGOT) 25 12 - 45 U/L    ALT(SGPT) 14 2 - 50 U/L    Alkaline Phosphatase 61 30 - 99 U/L    Total Bilirubin 0.8 0.1 - 1.5 mg/dL    Albumin 3.0 (L) 3.2 - 4.9 g/dL    Total Protein 5.9 (L) 6.0 - 8.2 g/dL    Globulin 2.9 1.9 - 3.5 g/dL    A-G Ratio 1.0 g/dL   PROTHROMBIN TIME   Result Value Ref Range    PT 15.7 (H) 12.0 - 14.6 sec    INR 1.28 (H) 0.87 - 1.13   APTT   Result Value Ref Range    APTT 35.9 24.7 - 36.0 sec   COD (ADULT)   Result Value Ref Range    ABO Grouping Only O     Rh Grouping Only POS     Antibody Screen-Cod NEG    TROPONIN   Result Value Ref Range    Troponin T 141 (H) 6 - 19 ng/L   ESTIMATED GFR   Result Value Ref Range    GFR If African American >60 >60 mL/min/1.73 m 2    GFR If Non African American >60 >60 mL/min/1.73 m 2   Lipid Panel   Result Value Ref Range    Cholesterol,Tot 127 100 - 199 mg/dL    Triglycerides 49 0 - 149 mg/dL    HDL 48 >=40 mg/dL    LDL 69 <100 mg/dL   TROPONIN   Result Value Ref Range    Troponin T 189 (H) 6 - 19 ng/L   EKG (NOW)   Result Value Ref Range    Report       [facility] Emergency Dept.    Test Date:  2021-06-12  Pt Name:    CHRISTINE FORTY-ONE             Department: ER  MRN:        [id number]                       Room:        11  Gender:     F                            Technician: [id number]  :        [id number]                   Requested By:ROSI MERRILL  Order #:    [id number]                    Reading MD: ROSI MERRILL MD    Measurements  Intervals                                Axis  Rate:       78                           P:  WA:                                      QRS:        -47  QRSD:       94                           T:          54  QT:         428  QTc:        488    Interpretive Statements  ATRIAL FIBRILLATION, V-RATE  67- 85  LEFT ANTERIOR FASCICULAR BLOCK  ANTERIOR Q WAVES, POSSIBLY DUE TO LVH  BORDERLINE PROLONGED QT INTERVAL  No previous ECG available for comparison  Electronically Signed On 2021 19:08:10 PDT by ROSI MERRILL MD        All labs reviewed by me.    EKG  12 Lead EKG interpreted by me as noted above.    RADIOLOGY  DX-CHEST-PORTABLE (1 VIEW)   Final Result      Cardiomegaly. No focal consolidation or pleural effusions.      CT-CTA NECK WITH & W/O-POST PROCESSING   Final Result      1.  Mild calcified plaque in the left carotid bulb and proximal left internal carotid artery with less than 50% stenosis.      2.  Soft tissue density in the mediastinum and hilum could represent lymphadenopathy which is incompletely visualized/evaluated on this neck CTA. Recommend follow-up chest CT.      CT-CTA HEAD WITH & W/O-POST PROCESS   Final Result      Right middle cerebral artery M1 occlusion.      Neuro IR is aware of findings.         CT-CEREBRAL PERFUSION ANALYSIS   Final Result      1.  Cerebral blood flow less than 30% likely representing completed infarct = 0 mL.      2.  T Max more than 6 seconds likely representing combination of completed infarct and ischemia = 105 mL.      3.  Mismatched volume likely representing ischemic brain/penumbra = 105 ml      4.  Please note that the cerebral perfusion was performed on the limited brain tissue around the basal ganglia  region. Infarct/ischemia outside the CT perfusion sections can be missed in this study.      CT-HEAD W/O   Final Result      1.  Hyperdense right MCA with right temporal and inferior frontoparietal lobe edema with slight loss of gray-white matter differentiation consistent with infarct with subtle area of increased attenuation at the junction of the right frontoparietal lobe    could represent some petechial hemorrhage.      2.  This was discussed with Dr. Baker at 6:55 PM who had already talked to neurology and the neurointerventionist on call.      IR-THROMBO MECHANICAL ARTERY,INIT    (Results Pending)   EC-ECHOCARDIOGRAM COMPLETE W/O CONT    (Results Pending)   CT-HEAD W/O    (Results Pending)   MR-BRAIN-W/O    (Results Pending)   MR-BRAIN-W/O    (Results Pending)   MR-BRAIN-W/O    (Results Pending)     The radiologist's interpretation of all radiological studies have been reviewed by me.    COURSE & MEDICAL DECISION MAKING  Pertinent Labs & Imaging studies reviewed. (See chart for details)    6:30 PM - Patient seen and examined at the charge desk. Dr. Yang (Neurology) present as well. Ordered DX-Chest, CT-Head w/o, CT-Cerebral perfusion analysis, CT-CTA head w/ and w/o, Ct-CTA neck w/ and w/o, CBC w/ diff, CMP, Prothrombin time, APTT, COD, Troponin, and EKG to evaluate her symptoms. The differential diagnoses include but are not limited to: large vessel occlusion vs bleed.    6:50 PM - Patient's imaging studies reviewed. Dr. Yang informed me the patient has a right M1 occlusion with no perfusion. Dr. Yang recommends no tPA as patient is out of window and has a history of aneurysm. Patient has a DNR/DNI but patient's daughter was called and agreed to remove DNR/DNI status to perform thrombectomy. Dr. Yang confirmed that patient walks without assistance at baseline. NIHSS: 15.    6:54 PM Patient was reevaluated at bedside. Patient was updated on imaging and told she is having a stroke. I informed the  patient she would be hospitalized for treatment to remove a blood clot in her brain and give her the best possible chance of recovery.    6:57 PM - I discussed the patient's case and the above findings with Dr. Coppola (Intensivist) who agreed to evaluate the patient for admission to the ICU.    7:05 PM - I discussed the patient's case and the above findings with Dr. Jenkins (Hospitalist) who agreed to evaluate the patient for hospitalization.    Medical Decision Making: At this point time patient appears to have had a acute M1 occlusion and stroke.  Patient was outside the window for alteplase but within the window for thrombectomy.  Therefore patient will be taken emergently to for thrombectomy.    CRITICAL CARE  I provided critical care services, which included medication orders, frequent reevaluations of the patient's condition and response to treatment, ordering and reviewing test results, and discussing the case with various consultants.  The critical care time associated with the care of the patient was 30 minutes. Review chart for interventions. This time is exclusive of any other billable procedures.        DISPOSITION:  Patient will be hospitalized by Dr. Coppola in critical condition.      FINAL IMPRESSION  1. Acute stroke due to occlusion of right middle cerebral artery (HCC)    2. Elevated troponin    2.      The critical care time associated with this patient was 30 minutes. This time is exclusive of any other billable procedures.      Melo CASTANEDA (Mercedes), am scribing for, and in the presence of, Jonnie Baker M.D.    Electronically signed by: Melo Crawford), 6/12/2021    Jonnie CASTANEDA M.D. personally performed the services described in this documentation, as scribed by Melo Kruger in my presence, and it is both accurate and complete.    C.    The note accurately reflects work and decisions made by me.  Jonnie Baker M.D.  6/13/2021  3:59 AM

## 2021-06-13 NOTE — ED NOTES
Med rec is a partial. Spoke to family . Family on their way to [place]. Unsure if  Pt is on more medications.  Family believes that pt fills at [facility]. Will attempt to call pharmacy in AM.

## 2021-06-13 NOTE — PROGRESS NOTES
Neurology Interval Communication Note    I called the patient’s granddaughter, April updated her regarding the results of the imaging studies. She confirmed understanding of the plan to go for emergent thrombectomy given acute right M one LVO, small core, large penumbra, and good collaterals.    The patient is currently in IR. Orders placed for post IR protocol. The case was signed out to my colleague Dr. Tong Oswald who is  covering the service this evening.     Troponin resulted at 141- to be addressed post procedure    Discussed anticipated blood pressure parameter paradogm given aortic stenosis with Alfreda Coppola, Darek, and Ignacio.     LIZETH Yang MD

## 2021-06-13 NOTE — HOSPITAL COURSE
Ms. Bingham is a 89-year-old female with PMH significant for HTN and aortic stenosis who was admitted 6/12/2021 for right M1 occlusion status post thrombectomy with TICI 2A/2B, also noted 8 mm right MCA bifurcation aneurysm.

## 2021-06-13 NOTE — PROGRESS NOTES
Troponin increased to 189 from 141. Dr. Gonda notified, order to recheck in 6hr placed. Next troponin scheduled for 0730.

## 2021-06-13 NOTE — PROGRESS NOTES
Pt speech slightly clearer, weak movement to command not anastacia to L U/L E. SBP with in parameters established by Dr. horta after the initiation of Kevin synephrine.

## 2021-06-13 NOTE — THERAPY
Speech Language Pathology   Clinical Swallow Evaluation     Patient Name: Ángel Forty-One  AGE:  89 y.o., SEX:  female  Medical Record #: [id number]  Today's Date: 6/13/2021     Precautions  Precautions: Fall Risk, Swallow Precautions ( See Comments)    Assessment    Patient is 89 y.o. female admitted after being found down. Imaging revealed hyperdense R MCA with R temporal and inferior frontoparietal lobe edema with slight loss of gray-white differentiation consistent infarct with subtle area of increased attenuation at the junction of the R frontoparietal lobe that could represent some petechial hemorrhage. CT angio head with the R MCA M1 occlusion. Patient has a history of HTN and cerebral aneurysm. CxR 6/13: no focal consolidation or pleural effusions.    Clinical swallow evaluation completed. She is lethargic but motivated and wants to do her best. Oral motor function revealed left facial weakness, lingual weakness on the left, weak laryngeal elevation and volitional cough. She is dysarthric but intelligible at the simple phrase level. She required verbal cueing to suck,chew, swallow ice chips but had no overt signs of aspiration with ice. She consumed approximately 1/3rd tsp of mildly thick liquid without signs of aspiration but had instant coughing with 1/2 tsp amounts. At this point she was beginning to require increased amounts of cueing to stay awake. Evaluation was stopped. Currently, this patient is not safe for PO. Okay for 3 ice chips per hour with RN only. Plan for re evaluation tomorrow if she is more awake. If she continues to be lethargic please consider Cortrak/TF.    Plan    Recommend Speech Therapy 5 times per week until therapy goals are met for the following treatments:  Dysphagia Training, Expression Training and Patient / Family / Caregiver Education.    Discharge Recommendations: Recommend post-acute placement for additional speech therapy services prior to discharge home        Objective       06/13/21 1121   Prior Living Situation   Prior Services Other (Comments)  (landlord/friends help cook some meals and drive when needed)   Lives with - Patient's Self Care Capacity Alone and Able to Care For Self   Prior Level Of Function   Communication Within Functional Limits   Swallow Within Functional Limits   Dentition Intact   Dentures None   Hearing Within Functional Limits for Evaluation   Vision Reading ;Distance   Patient's Primary Language English   Oral Motor Eval    Is Patient Able to Complete Oral Motor Eval Yes but Impaired   Labial Function   Labial Structure At Rest Minimal  (left facial droop)   Labial Vowel Production / I /, / U / Minimal  (bilateral, possibly 2/2 lethargy and generalized weakness)   Frown, Pucker Severe  (poor pucker/lip seal)   Lingual Function   Lingual Structure At Rest Within Functional Limits   Lingual Protrude Moderate  (to dentition)   Lingual Retract Minimal   Elevate In Mouth Minimal   Elevate Outside Mouth Severe   Lateralization Minimal Right;Severe Left   / Pa / 5X's Minimal   / Ta / 5X's Minimal   / Ka / 5X's Minimal   / [place] / 5X's Moderate   Jaw   Jaw Structure At Rest Within Functional Limits   Bite ([place]) Within Functional Limits   Chew (Rotary) Moderate   Velar Function   Velar Structure At Rest Within Functional Limits   / A / Prolonged Within Functional Limits   Laryngeal Function   Voice Quality Within Functional Limits   Volutional Cough Moderate   Excursion Upon Swallow Weak    Max Phonation Time (Seconds) 3   Oral Food Presentation   Ice Chips Minimal   Single Swallow Mildly Thick (2) - (Nectar Thick)  Severe   Single Swallow Thin (0) Not Tested   Liquidised (3) Not Tested   Pureed (4) Not Tested   Regular (7) Not Tested   Self Feeding Not Tested   Dysphagia Strategies / Recommendations   Strategies / Interventions Recommended (Yes / No) Yes   Compensatory Strategies Strict 1:1 Feeding;Head of Bed 90 Degrees During Eating /  "Drinking;No Straws;Multiple Swallows   Diet / Liquid Recommendation NPO  (ok for 3 ice chips an hour with RN  only)   Medication Administration    (non oral route if available)   Therapy Interventions Dysphagia Therapy By Speech Language Pathologist   Dysphagia Rating   Nutritional Liquid Intake Rating Scale Nothing by mouth   Nutritional Food Intake Rating Scale Nothing by mouth   Patient / Family Goals   Patient / Family Goal #1 \"Something to settle my stomach\"   Short Term Goals   Short Term Goal # 1 The patient will consume prefeeding trials without signs of aspiration given 1:1 feeding and cueing by SLP         "

## 2021-06-13 NOTE — CONSULTS
"Neurology STROKE CODE H&P  Neurohospitalist Service, [facility]    Referring Physician: Dr. Baker    To obtain the most accurate data regarding the time called, and time patient seen, refer to the stroke run-sheet and chart.  For time of CT, refer to the radiology report. See A&P below for TPA Decision and door to needle time if and when applicable.    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  She is prescribed a blood thinner I spoke to the patient's granddaughter April [phone].  She still drives, ambulates unassisted, independent with ADLs, and works in her garden.  LKN 1100 the patient was reported to be speaking normally on the phone with a friend.  Around 1730 the patient was found in her home altered, with deficits.  The patient arrives hypertensive to 160/70, right gaze deviation, and incontinent of urine, without any witnessed seizures, and no tongue biting.  Denies headache.  The patient has a DNR/DNI.  The patient did not get any better nor worse per EMS en route through arrival.    I spoke to the daughter who agreed to temporarily reverse the DNR/DNI if determined to be a candidate for IR intervention.  She said patient is on \"thinners\" but doesn't know if antiplatelet or AC.    Review of systems: In addition to what is detailed in the HPI above, all other systems reviewed and are negative.    Past Medical History:   HTN and cerebral aneurism in 2013    FHx:  No hx of stroke in the young    SHx:  Non-smoker, and independent with ADLs, lives alone    Allergies:  Penicillin    Medications:  No current facility-administered medications for this encounter.  No current outpatient medications on file.    Physical Examination:    /70    General: Patient awake and anxious, right gaze " deviation  Eyes: examination of optic disks not indicated at this time given acuity of consult  CV: RRR    NEUROLOGICAL EXAM:     Mental status: eyes open, oriented, follows simple and appendicular commands  Speech and language: speech is dysarthric. The patient is able to name and repeat.  No aphasia  Cranial nerve exam: Pupils are equal, round and reactive to light bilaterally. Visual fields: L HHA. Extraocular muscles: fixed right gaze deviation. Face is notable for left face droop. Due to neglect and acuity of consult, unable to appropriately assess sensation in the face, hearing to finger rub, palate elevation, shoulder shrug, or tongue.  Motor exam: left sided hemiparesis 3/5 LUE and LLE. Tone is decreased on left compared to right. No abnormal movements were seen on exam.  Sensory exam: somatagnosia and left hemineglect  Deep tendon reflexes: 1+ and symmetric  Coordination: no ataxia   Gait: deferred given patient is actively being transported to CT scanner    NIH Stroke Scale:    1a. Level of Consciousness (Alert, drowsy, etc): 1= Drowsy    1b. LOC Questions (Month, age): 0= Answers both correctly    1c. LOC Commands (Open/close eyes make fist/let go): 0= Obeys both correctly    2.   Best Gaze (Eyes open - patient follows examiner's finger on face): 2= Forced deviation    3.   Visual Fields (introduce visual stimulus/threat to patient's field quadrants): 2= Complete Hemianopia  4.   Facial Paresis (Show teeth, raise eyebrows and squeeze eyes shut): 1= Minor     5a. Motor Arm - Left (Elevate arm to 90 degrees if patient is sitting, 45 degrees if  supine): 2= Can't resist gravity    5b. Motor Arm - Right (Elevate arm to 90 degrees if patient is sitting, 45 degrees if supine): 0= No drift    6a. Motor Leg - Left (Elevate leg 30 degrees with patient supine): 2= Can't resist gravity    6b. Motor Leg - Right  (Elevate leg 30 degrees with patient supine): 1= Drift    7.   Limb Ataxia (Finger-nose, heel down shin):  0= No ataxia    8.   Sensory (Pin prick to face, arm, trunk and leg - compare side to side): 1= Partial loss    9.  Best Language (Name item, describe a picture and read sentences): 0= No aphasia    10. Dysarthria (Evaluate speech clarity by patient repeating listed words): 1= Mild to moderate slurring    11. Extinction and Inattention (Use information from prior testing to identify neglect or  double simultaneous stimuli testing): 2= Complete neglect    Total NIH Score: 15    Modified [place] Scale (MRS): 1 = No significant disability, despite symptoms; able to perform all usual duties and activities    Objective Data:    Labs:  No results found for: PROTHROMBTM, INR   No results found for: WBC, RBC, HEMOGLOBIN, HEMATOCRIT, MCV, MCH, MCHC, MPV, NEUTSPOLYS, LYMPHOCYTES, MONOCYTES, EOSINOPHILS, BASOPHILS, HYPOCHROMIA, ANISOCYTOSIS   No results found for: SODIUM, POTASSIUM, CHLORIDE, CO2, GLUCOSE, BUN, CREATININE, BUNCREATRAT, GLOMRATE   No results found for: CHOLSTRLTOT, LDL, HDL, TRIGLYCERIDE    No results found for: ALKPHOSPHAT, ASTSGOT, ALTSGPT, TBILIRUBIN     Imaging/Testing:    I interpreted and/or reviewed the patient's neuroimaging    CT-HEAD W/O   Preliminary Result      1.  Hyperdense right MCA with right temporal and inferior frontoparietal lobe edema with slight loss of gray-white matter differentiation consistent with infarct with subtle area of increased attenuation at the junction of the right frontoparietal lobe    could represent some petechial hemorrhage.      DX-CHEST-PORTABLE (1 VIEW)    (Results Pending)   CT-CEREBRAL PERFUSION ANALYSIS    (Results Pending)   CT-CTA HEAD WITH & W/O-POST PROCESS    (Results Pending)   CT-CTA NECK WITH & W/O-POST PROCESSING    (Results Pending)   IR-THROMBO MECHANICAL ARTERY,INIT    (Results Pending)   IR-NEURO INTERVENTIONAL CONSULT-IP    (Results Pending)     CTA 6/12/21 showing a R M1 cutoff and CTP without core and large penumbra.  Great  "collaterals.    Assessment and Plan:    Ángel Jordan is a 89 y.o. woman with hx of HTN and prior cerebral aneurism s/p intervention presenting for whom neurology has been consulted for acute onset R MCA syndrome.  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  Additionally, evidence as published in [facility] Jan 20, 2021: Two randomized trials demonstrated EVT only is non-inferior to IV tPA + EVT in tPA eligible proximal LVO patients presenting at EVT centers. The patient is a candidate for IR intervention given low mRS, small to no core infarct, and great collaterals.  The case was discussed with April, granddaughter who agreed to temporarily reverse DNR/DNI for heroic measure and IR mechanical thrombectomy in an effort to give patient the best chance at maintaining her quality of life and independence.  The differential diagnosis as it pertains to etiology includes large vessel disease ie. atherosclerotic disease and thromboembolism vs. cardioembolic phenomena.  Patient to ultimately be admitted to ICU given critical care and need for close monitoring.    Plan:    - ACUTE TREATMENT WITH IR intervention for MT as managed with IR and coordinated care  - Admit to the intensive care unit  - Neurology checks and vital signs per protocol; perform swallow screen  - Hold antiplatelets and any blood thinners  - high intensity statin  - interval CT head tomorrow 6/13/21  - serum studies for stroke risk factors: lipid panel & hemoglobin A1C  - To identify stroke territory, obtain MRI brain  - Obtain a 2D echocardiogram  - evaluate and treat with PT/OT/ST    After the endovascular intervention, please consider SBP < 150 for TICI 3 and 2b; given aortic stenosis would recommend mitigating risk of reperfusion injury with cardiac function.  Contact neurohospitalist to discuss BP parameters; pt. " May autoregulate.    The evaluation of the patient, and recommended management, was discussed with Dr. Baker (ED) and Dr. Pierre (IR).  Dual physician consent signed and in chart.  April, RADHA provided verbal consent for thrombectomy with ED RN acknowledging verbal consent as witness.    Cullen Yang MD  Neurohospitalist, Acute Care Services   of Neurology    CRITICAL CARE    Upon my evaluation, this patient had a high probability of imminent or life-threatening deterioration due to cerebrovascular accident which required my direct attention, intervention, and personal management.  I personally provided 50 minutes of total critical care time outside of time spent on separately billable/documented procedures. Time includes: review of laboratory data, review of radiology studies, discussion with consultants, discussion with family/patient, determining candidacy for thrombolytics and/or IR intervention, and monitoring for potential decompensation.  Interventions were performed as documented in detail in the chart.

## 2021-06-13 NOTE — ASSESSMENT & PLAN NOTE
Reports hx of aortic stenosis, monitor cardiac output especially during thrombectomy  Impressive murmur

## 2021-06-13 NOTE — CONSULTS
Critical Care Consultation    Date of consult: 6/12/2021    Referring Physician  Jonas Coppola D.O.    Reason for Consultation  CVA    History of Presenting Illness  89 y.o. female who presented 6/12/2021 with left sided weakness and slurred speech.  The pt was seen earlier in the day, 1100 hrs, and noted to be in her normal state of health.  At approx 1700 hrs the pt was found on the floor with altered level of consciousness.  EMS was activated and the pt was taken to the ER.  Evaluation in the ER showed the pt had R MCA lesion.  The pt was not a candidate for TPA, so the pt was taken to IR for mechanical thrombectomy.      Code Status  Full Code    Review of Systems  Review of Systems   Unable to perform ROS: Mental status change       Past Medical History   has a past medical history of Aneurysm ([facility]) (2000) and SBO (small bowel obstruction) ([facility]) (2013).    Surgical History   has no past surgical history on file.    Family History  family history is not on file.    Social History       Medications  Home Medications     Reviewed by Ofe Johnson (Pharmacy Tech) on 06/12/21 at 1925  Med List Status: Partial   Medication Last Dose Status   alendronate (FOSAMAX) 70 MG Tab unknown Active   furosemide (LASIX) 20 MG Tab unknown Active   NON SPECIFIED unknown Active   potassium chloride SA (KDUR) 20 MEQ Tab CR unknown Active              Current Facility-Administered Medications   Medication Dose Route Frequency Provider Last Rate Last Admin   • atorvastatin (LIPITOR) tablet 40 mg  40 mg Oral Q EVENING Cullen Yang M.D.       • labetalol (NORMODYNE/TRANDATE) injection 10 mg  10 mg Intravenous Q10 MIN PRN SAMREEN Cramer.O.       • hydrALAZINE (APRESOLINE) injection 10 mg  10 mg Intravenous Q2HRS PRN SAMREEN Cramer.O.       • niCARdipine (CARDENE) 25 mg in  mL Infusion  0-15 mg/hr Intravenous Continuous Jonas Coppola D.O.       • [Held by provider] aspirin (ASA) chewable tab 81 mg  81 mg Oral DAILY  Cullen Yang M.D.       • NS (BOLUS) infusion 500 mL  500 mL Intravenous Once PRN Syeda Pierre M.D.       • fentaNYL (SUBLIMAZE) injection 12.5-50 mcg  12.5-50 mcg Intravenous PRN Syeda Pierre M.D.   25 mcg at 06/12/21 2146   • midazolam (VERSED) injection 0.5-2 mg  0.5-2 mg Intravenous PRN Syeda Pierre M.D.       • HYDRALAZINE HCL 20 MG/ML INJ SOLN              Current Outpatient Medications   Medication Sig Dispense Refill   • furosemide (LASIX) 20 MG Tab Take 20 mg by mouth every day.     • alendronate (FOSAMAX) 70 MG Tab Take 70 mg by mouth every 7 days.     • potassium chloride SA (KDUR) 20 MEQ Tab CR Take 20 mEq by mouth every day.     • NON SPECIFIED Take 1 tablet by mouth 2 times a day. unknown         Allergies  Not on File    Vital Signs last 24 hours  Temp:  [37.2 °C (98.9 °F)] 37.2 °C (98.9 °F)  Pulse:  [50-82] 65  Resp:  [8-39] 11  BP: (163-218)/(67-85) 203/85  SpO2:  [88 %-100 %] 95 %    Physical Exam  Physical Exam  Constitutional:       Appearance: She is ill-appearing.   HENT:      Head: Normocephalic and atraumatic.      Nose: Nose normal.      Mouth/Throat:      Mouth: Mucous membranes are moist.      Pharynx: Oropharynx is clear.   Eyes:      Conjunctiva/sclera: Conjunctivae normal.      Pupils: Pupils are equal, round, and reactive to light.   Cardiovascular:      Rate and Rhythm: Normal rate.      Pulses: Normal pulses.      Heart sounds: Murmur (Systolic ejection murmur, loudest over R 2nd ICS at sternal border 3/6.) heard.     Pulmonary:      Effort: Pulmonary effort is normal.      Breath sounds: Normal breath sounds.   Abdominal:      General: Abdomen is flat. There is no distension.      Palpations: Abdomen is soft.      Tenderness: There is no abdominal tenderness. There is no guarding.   Musculoskeletal:         General: No swelling.      Cervical back: Normal range of motion.      Right lower leg: No edema.      Left lower leg: No edema.      Comments: Pt  able to wiggle right toes and dorsiflex/plantar flex right foot.  Pt able to move left great toe, but unable to dorsiflex or plantar flex left foot.    Pt able to move right hand and  my finger.  Pt unable to move left arm or left hand.    Skin:     General: Skin is warm and dry.      Capillary Refill: Capillary refill takes less than 2 seconds.      Findings: Bruising present.   Neurological:      Mental Status: She is disoriented.      Cranial Nerves: Cranial nerve deficit present.      Comments: Left eye ptosis, and left facial droop.         Fluids  No intake or output data in the 24 hours ending 06/12/21 2212    Laboratory  Recent Results (from the past 48 hour(s))   CBC WITH DIFFERENTIAL    Collection Time: 06/12/21  6:31 PM   Result Value Ref Range    WBC 14.3 (H) 4.8 - 10.8 K/uL    RBC 3.63 (L) 4.20 - 5.40 M/uL    Hemoglobin 10.9 (L) 12.0 - 16.0 g/dL    Hematocrit 33.8 (L) 37.0 - 47.0 %    MCV 93.1 81.4 - 97.8 fL    MCH 30.0 27.0 - 33.0 pg    MCHC 32.2 (L) 33.6 - 35.0 g/dL    RDW 53.3 (H) 35.9 - 50.0 fL    Platelet Count 145 (L) 164 - 446 K/uL    MPV 10.8 9.0 - 12.9 fL    Neutrophils-Polys 87.70 (H) 44.00 - 72.00 %    Lymphocytes 6.80 (L) 22.00 - 41.00 %    Monocytes 4.80 0.00 - 13.40 %    Eosinophils 0.00 0.00 - 6.90 %    Basophils 0.10 0.00 - 1.80 %    Immature Granulocytes 0.60 0.00 - 0.90 %    Nucleated RBC 0.00 /100 WBC    Neutrophils (Absolute) 12.52 (H) 2.00 - 7.15 K/uL    Lymphs (Absolute) 0.97 (L) 1.00 - 4.80 K/uL    Monos (Absolute) 0.69 0.00 - 0.85 K/uL    Eos (Absolute) 0.00 0.00 - 0.51 K/uL    Baso (Absolute) 0.02 0.00 - 0.12 K/uL    Immature Granulocytes (abs) 0.08 0.00 - 0.11 K/uL    NRBC (Absolute) 0.00 K/uL   COMP METABOLIC PANEL    Collection Time: 06/12/21  6:31 PM   Result Value Ref Range    Sodium 138 135 - 145 mmol/L    Potassium 3.8 3.6 - 5.5 mmol/L    Chloride 106 96 - 112 mmol/L    Co2 20 20 - 33 mmol/L    Anion Gap 12.0 7.0 - 16.0    Glucose 99 65 - 99 mg/dL    Bun 20 8 - 22  mg/dL    Creatinine 0.84 0.50 - 1.40 mg/dL    Calcium 8.2 (L) 8.5 - 10.5 mg/dL    AST(SGOT) 25 12 - 45 U/L    ALT(SGPT) 14 2 - 50 U/L    Alkaline Phosphatase 61 30 - 99 U/L    Total Bilirubin 0.8 0.1 - 1.5 mg/dL    Albumin 3.0 (L) 3.2 - 4.9 g/dL    Total Protein 5.9 (L) 6.0 - 8.2 g/dL    Globulin 2.9 1.9 - 3.5 g/dL    A-G Ratio 1.0 g/dL   PROTHROMBIN TIME    Collection Time: 21  6:31 PM   Result Value Ref Range    PT 15.7 (H) 12.0 - 14.6 sec    INR 1.28 (H) 0.87 - 1.13   APTT    Collection Time: 21  6:31 PM   Result Value Ref Range    APTT 35.9 24.7 - 36.0 sec   COD (ADULT)    Collection Time: 21  6:31 PM   Result Value Ref Range    ABO Grouping Only O     Rh Grouping Only POS     Antibody Screen-Cod NEG    TROPONIN    Collection Time: 21  6:31 PM   Result Value Ref Range    Troponin T 141 (H) 6 - 19 ng/L   ESTIMATED GFR    Collection Time: 21  6:31 PM   Result Value Ref Range    GFR If African American >60 >60 mL/min/1.73 m 2    GFR If Non African American >60 >60 mL/min/1.73 m 2   EKG (NOW)    Collection Time: 21  6:59 PM   Result Value Ref Range    Report       [facility] Emergency Dept.    Test Date:  2021  Pt Name:    CHRISTINE LOPEZ             Department: ER  MRN:        [id number]                      Room:        11  Gender:     F                            Technician: [id number]  :        [id number]                   Requested By:ROSI MERRILL  Order #:    [id number]                    Reading MD: ROSI MERRILL MD    Measurements  Intervals                                Axis  Rate:       78                           P:  OR:                                      QRS:        -47  QRSD:       94                           T:          54  QT:         428  QTc:        488    Interpretive Statements  ATRIAL FIBRILLATION, V-RATE  67- 85  LEFT ANTERIOR FASCICULAR BLOCK  ANTERIOR Q WAVES, POSSIBLY DUE TO LVH  BORDERLINE PROLONGED QT  INTERVAL  No previous ECG available for comparison  Electronically Signed On 6- 19:08:10 PDT by ROSI BAKER MD         Imaging  CT-CTA NECK WITH & W/O-POST PROCESSING   Final Result      1.  Mild calcified plaque in the left carotid bulb and proximal left internal carotid artery with less than 50% stenosis.      2.  Soft tissue density in the mediastinum and hilum could represent lymphadenopathy which is incompletely visualized/evaluated on this neck CTA. Recommend follow-up chest CT.      CT-CTA HEAD WITH & W/O-POST PROCESS   Final Result      Right middle cerebral artery M1 occlusion.      Neuro IR is aware of findings.         CT-CEREBRAL PERFUSION ANALYSIS   Final Result      1.  Cerebral blood flow less than 30% likely representing completed infarct = 0 mL.      2.  T Max more than 6 seconds likely representing combination of completed infarct and ischemia = 105 mL.      3.  Mismatched volume likely representing ischemic brain/penumbra = 105 ml      4.  Please note that the cerebral perfusion was performed on the limited brain tissue around the basal ganglia region. Infarct/ischemia outside the CT perfusion sections can be missed in this study.      CT-HEAD W/O   Final Result      1.  Hyperdense right MCA with right temporal and inferior frontoparietal lobe edema with slight loss of gray-white matter differentiation consistent with infarct with subtle area of increased attenuation at the junction of the right frontoparietal lobe    could represent some petechial hemorrhage.      2.  This was discussed with Dr. Baker at 6:55 PM who had already talked to neurology and the neurointerventionist on call.      DX-CHEST-PORTABLE (1 VIEW)    (Results Pending)   IR-THROMBO MECHANICAL ARTERY,INIT    (Results Pending)   EC-ECHOCARDIOGRAM COMPLETE W/O CONT    (Results Pending)   CT-HEAD W/O    (Results Pending)   MR-BRAIN-W/O    (Results Pending)   MR-BRAIN-W/O    (Results Pending)        Assessment/Plan  * Acute right MCA stroke (HCC)  Assessment & Plan  Pt is a 89 yo  female with a h/o AS and HTN.  Pt found on the floor today with altered mental status.  The pt was transported to the ER and found to have R MCA lesion and pt is not a candidate for tPA.  IR was able to partially remove clot and got some flow through R MCA.  Because of pt's AS pt needs higher SBP and because of pt's CVA pt's bp needs to be lowered to decrease risk of reperfusion bleeding.    - Admit to ICU for s/p mechanical thrombectomy  -  mmHg to 180 mmHg.  - Pt has elevated trop and in V2, V3 ST changes on EKG.  Normally, pt would be given ASA, but due to pt's recent IR intervention ASA is contraindicated.  - Pt will be monitored in ICU overnight per s/p mechanical thrombectomy intervention.  Pt may be candidate for ASA 13 Jun 2021.      Discussed patient condition and risk of morbidity and/or mortality with Hospitalist.    The patient remains critically ill.  Critical care time = 63 minutes in directly providing and coordinating critical care and extensive data review.  No time overlap and excludes procedures.

## 2021-06-13 NOTE — PROGRESS NOTES
2 RN skin check complete.  Skin assessed under devices yes.  Confirmed pressure ulcers found on NA. Sacrum pink, blanching, mepilex placed, heels floated.  New potential pressure ulcers noted on N/A.   The following interventions in place turn q 2,float heels, mepilex to sacrum.

## 2021-06-13 NOTE — PROGRESS NOTES
Pt arrived to R112. Some slurred speech observed, pt oriented to self and year. L facial droop observed, R side deviated gaze. L pupil sluggish round 3mm. R pupil Brisk 3mm Round. Pt able to move R U/L E to command and spontaneous. Pt states decreased sensation to L U/L E, slight contraction noted to pain, no movement to command. Barney to gravity.

## 2021-06-13 NOTE — ASSESSMENT & PLAN NOTE
-Body mass index is 17.57 kg/m².   -dietary consult  -encourage PO intake  -now with acute CVA s/p thrombectomy  -PT OT SLP  -Avoid narcotics, benzos and hypnotics  -Fall precautions

## 2021-06-14 LAB
ANION GAP SERPL CALC-SCNC: 10 MMOL/L (ref 7–16)
BASOPHILS # BLD AUTO: 0.5 % (ref 0–1.8)
BASOPHILS # BLD: 0.05 K/UL (ref 0–0.12)
BUN SERPL-MCNC: 22 MG/DL (ref 8–22)
CALCIUM SERPL-MCNC: 8.8 MG/DL (ref 8.5–10.5)
CHLORIDE SERPL-SCNC: 104 MMOL/L (ref 96–112)
CO2 SERPL-SCNC: 22 MMOL/L (ref 20–33)
CREAT SERPL-MCNC: 0.78 MG/DL (ref 0.5–1.4)
EOSINOPHIL # BLD AUTO: 0.04 K/UL (ref 0–0.51)
EOSINOPHIL NFR BLD: 0.4 % (ref 0–6.9)
ERYTHROCYTE [DISTWIDTH] IN BLOOD BY AUTOMATED COUNT: 53.4 FL (ref 35.9–50)
GLUCOSE SERPL-MCNC: 94 MG/DL (ref 65–99)
HCT VFR BLD AUTO: 29.1 % (ref 37–47)
HGB BLD-MCNC: 9.6 G/DL (ref 12–16)
IMM GRANULOCYTES # BLD AUTO: 0.05 K/UL (ref 0–0.11)
IMM GRANULOCYTES NFR BLD AUTO: 0.5 % (ref 0–0.9)
LYMPHOCYTES # BLD AUTO: 0.95 K/UL (ref 1–4.8)
LYMPHOCYTES NFR BLD: 9.2 % (ref 22–41)
MCH RBC QN AUTO: 30.4 PG (ref 27–33)
MCHC RBC AUTO-ENTMCNC: 33 G/DL (ref 33.6–35)
MCV RBC AUTO: 92.1 FL (ref 81.4–97.8)
MONOCYTES # BLD AUTO: 1.04 K/UL (ref 0–0.85)
MONOCYTES NFR BLD AUTO: 10.1 % (ref 0–13.4)
NEUTROPHILS # BLD AUTO: 8.19 K/UL (ref 2–7.15)
NEUTROPHILS NFR BLD: 79.3 % (ref 44–72)
NRBC # BLD AUTO: 0 K/UL
NRBC BLD-RTO: 0 /100 WBC
PLATELET # BLD AUTO: 151 K/UL (ref 164–446)
PMV BLD AUTO: 11.3 FL (ref 9–12.9)
POTASSIUM SERPL-SCNC: 3.8 MMOL/L (ref 3.6–5.5)
RBC # BLD AUTO: 3.16 M/UL (ref 4.2–5.4)
SODIUM SERPL-SCNC: 136 MMOL/L (ref 135–145)
WBC # BLD AUTO: 10.3 K/UL (ref 4.8–10.8)

## 2021-06-14 PROCEDURE — 80048 BASIC METABOLIC PNL TOTAL CA: CPT

## 2021-06-14 PROCEDURE — 99232 SBSQ HOSP IP/OBS MODERATE 35: CPT | Performed by: HOSPITALIST

## 2021-06-14 PROCEDURE — 97162 PT EVAL MOD COMPLEX 30 MIN: CPT

## 2021-06-14 PROCEDURE — 770001 HCHG ROOM/CARE - MED/SURG/GYN PRIV*

## 2021-06-14 PROCEDURE — 51798 US URINE CAPACITY MEASURE: CPT

## 2021-06-14 PROCEDURE — 97167 OT EVAL HIGH COMPLEX 60 MIN: CPT

## 2021-06-14 PROCEDURE — 99233 SBSQ HOSP IP/OBS HIGH 50: CPT | Performed by: PSYCHIATRY & NEUROLOGY

## 2021-06-14 PROCEDURE — 92526 ORAL FUNCTION THERAPY: CPT

## 2021-06-14 PROCEDURE — 700102 HCHG RX REV CODE 250 W/ 637 OVERRIDE(OP): Performed by: HOSPITALIST

## 2021-06-14 PROCEDURE — 700105 HCHG RX REV CODE 258: Performed by: INTERNAL MEDICINE

## 2021-06-14 PROCEDURE — 700102 HCHG RX REV CODE 250 W/ 637 OVERRIDE(OP): Performed by: PSYCHIATRY & NEUROLOGY

## 2021-06-14 PROCEDURE — A9270 NON-COVERED ITEM OR SERVICE: HCPCS | Performed by: PSYCHIATRY & NEUROLOGY

## 2021-06-14 PROCEDURE — A9270 NON-COVERED ITEM OR SERVICE: HCPCS | Performed by: HOSPITALIST

## 2021-06-14 PROCEDURE — 85025 COMPLETE CBC W/AUTO DIFF WBC: CPT

## 2021-06-14 RX ORDER — SODIUM CHLORIDE, SODIUM LACTATE, POTASSIUM CHLORIDE, AND CALCIUM CHLORIDE .6; .31; .03; .02 G/100ML; G/100ML; G/100ML; G/100ML
250 INJECTION, SOLUTION INTRAVENOUS ONCE
Status: COMPLETED | OUTPATIENT
Start: 2021-06-14 | End: 2021-06-14

## 2021-06-14 RX ORDER — AMLODIPINE BESYLATE 5 MG/1
5 TABLET ORAL
Status: DISCONTINUED | OUTPATIENT
Start: 2021-06-14 | End: 2021-06-20 | Stop reason: HOSPADM

## 2021-06-14 RX ADMIN — AMLODIPINE BESYLATE 5 MG: 5 TABLET ORAL at 17:07

## 2021-06-14 RX ADMIN — ATORVASTATIN CALCIUM 40 MG: 40 TABLET, FILM COATED ORAL at 17:07

## 2021-06-14 RX ADMIN — SODIUM CHLORIDE, POTASSIUM CHLORIDE, SODIUM LACTATE AND CALCIUM CHLORIDE 250 ML: 600; 310; 30; 20 INJECTION, SOLUTION INTRAVENOUS at 02:33

## 2021-06-14 ASSESSMENT — COGNITIVE AND FUNCTIONAL STATUS - GENERAL
DRESSING REGULAR UPPER BODY CLOTHING: A LOT
DAILY ACTIVITIY SCORE: 14
WALKING IN HOSPITAL ROOM: A LITTLE
MOBILITY SCORE: 13
DRESSING REGULAR LOWER BODY CLOTHING: A LOT
MOVING FROM LYING ON BACK TO SITTING ON SIDE OF FLAT BED: A LOT
SUGGESTED CMS G CODE MODIFIER MOBILITY: CL
STANDING UP FROM CHAIR USING ARMS: A LOT
TOILETING: A LOT
TURNING FROM BACK TO SIDE WHILE IN FLAT BAD: UNABLE
MOBILITY SCORE: 10
SUGGESTED CMS G CODE MODIFIER DAILY ACTIVITY: CK
HELP NEEDED FOR BATHING: A LOT
EATING MEALS: A LITTLE
MOVING TO AND FROM BED TO CHAIR: A LOT
STANDING UP FROM CHAIR USING ARMS: A LOT
MOVING FROM LYING ON BACK TO SITTING ON SIDE OF FLAT BED: UNABLE
CLIMB 3 TO 5 STEPS WITH RAILING: A LOT
PERSONAL GROOMING: A LITTLE
DRESSING REGULAR UPPER BODY CLOTHING: A LOT
SUGGESTED CMS G CODE MODIFIER MOBILITY: CL
MOVING TO AND FROM BED TO CHAIR: UNABLE
DAILY ACTIVITIY SCORE: 14
CLIMB 3 TO 5 STEPS WITH RAILING: A LOT
EATING MEALS: A LITTLE
SUGGESTED CMS G CODE MODIFIER DAILY ACTIVITY: CK
TOILETING: A LOT
HELP NEEDED FOR BATHING: A LOT
DRESSING REGULAR LOWER BODY CLOTHING: A LOT
TURNING FROM BACK TO SIDE WHILE IN FLAT BAD: A LITTLE
WALKING IN HOSPITAL ROOM: A LOT
PERSONAL GROOMING: A LITTLE

## 2021-06-14 ASSESSMENT — ENCOUNTER SYMPTOMS
EYE PAIN: 0
DOUBLE VISION: 0
PALPITATIONS: 0
COUGH: 0
CHILLS: 0
WEAKNESS: 1
BLURRED VISION: 0
BRUISES/BLEEDS EASILY: 0
SINUS PAIN: 0
CONSTIPATION: 0
DEPRESSION: 0
DIZZINESS: 0
ABDOMINAL PAIN: 0
HEMOPTYSIS: 0
FEVER: 0
MYALGIAS: 0

## 2021-06-14 ASSESSMENT — GAIT ASSESSMENTS
DISTANCE (FEET): 10
GAIT LEVEL OF ASSIST: MINIMAL ASSIST
ASSISTIVE DEVICE: FRONT WHEEL WALKER

## 2021-06-14 ASSESSMENT — PAIN DESCRIPTION - PAIN TYPE
TYPE: ACUTE PAIN

## 2021-06-14 ASSESSMENT — FIBROSIS 4 INDEX: FIB4 SCORE: 4.38

## 2021-06-14 ASSESSMENT — ACTIVITIES OF DAILY LIVING (ADL): TOILETING: INDEPENDENT

## 2021-06-14 NOTE — THERAPY
"Occupational Therapy   Initial Evaluation     Patient Name: Sabiha Bingham  Age:  89 y.o., Sex:  female  Medical Record #: [id number]  Today's Date: 6/14/2021     Precautions  Precautions: Fall Risk, Swallow Precautions ( See Comments)  Comments: L sided neglect    Assessment  Patient is 89 y.o. female with a diagnosis of R M1 occlusion and R MCA aneurysm s/p thrombectomy with L sided weakness.  Additional factors influencing patient status / progress: weakness, fatigue, impaired balance, L neglect, impaired vision, impaired cognition.      Plan    Recommend Occupational Therapy 4 times per week until therapy goals are met for the following treatments:  Adaptive Equipment, Cognitive Skill Development, Neuro Re-Education / Balance, Self Care/Activities of Daily Living, Therapeutic Activities and Therapeutic Exercises.    DC Equipment Recommendations: Unable to determine at this time  Discharge Recommendations: Recommend post-acute placement for additional occupational therapy services prior to discharge home - Recommend PM&R consult    Subjective    When asked if she would like to brush her teeth she said \"That'd be wonderful, now I need to get something to make them dirty\"     Objective       06/14/21 1126   Prior Living Situation   Prior Services Intermittent Physical Support for ADL Per Family;Housekeeping / Homemaker Services   Housing / Facility 1 Story House   Steps Into Home 2   Rail Both Rail (Steps into Home)   Bathroom Set up Bathtub / Shower Combination;Grab Bars   Equipment Owned Single Point Cane;Grab Bar(s) In Tub / Shower  (3 SPCs)   Lives with - Patient's Self Care Capacity Alone and Able to Care For Self   Comments Pt's granddtr April present, reports she has cameras in the house so she can keep an eye on her if she falls. Friends help out with groceries. April helps w IADLs as needed   Prior Level of ADL Function   Self Feeding Independent   Grooming / Hygiene Independent   Bathing Independent   Dressing " Independent   Toileting Independent   Comments takes baths, not showers   Prior Level of IADL Function   Medication Management Independent   Laundry Independent   Kitchen Mobility Independent   Finances Independent   Home Management Independent   Shopping Independent   Prior Level Of Mobility Independent Without Device in Community;Independent Without Device in Home  (has multiple SPC throughout the home if needed)   Driving / Transportation Relatives / Others Provide Transportation;Driving Independent   Occupation (Pre-Hospital Vocational) Retired Due To Age   Leisure Interests Pets;Gardening;Reading   Precautions   Precautions Fall Risk;Swallow Precautions ( See Comments)   Comments L sided deficits, L neglect   Pain 0 - 10 Group   Therapist Pain Assessment Nurse Notified;0   Cognition    Cognition / Consciousness X   Speech/ Communication Delayed Responses;Hard of Hearing   Level of Consciousness Alert   Ability To Follow Commands 1 Step   Attention Impaired   Sequencing Impaired   Comments pleasant and cooperative, joking throughout   Active ROM Upper Body   Active ROM Upper Body  X   Dominant Hand Right   Comments LUE grossly weak, but able to achieve ROM in all planes w/ Active assist   Strength Upper Body   Upper Body Strength  X   Comments LUE 3/5   Sensation Upper Body   Upper Extremity Sensation  X   Comments LUE numbness   Neurological Concerns   Neurological Concerns Yes   Standing Posture During ADL's Lateral Lean Left   Coordination Upper Body   Coordination X   Comments LUE gross and fine motor impaired   Balance Assessment   Sitting Balance (Static) Fair -   Sitting Balance (Dynamic) Poor +   Standing Balance (Static) Poor +   Standing Balance (Dynamic) Poor   Weight Shift Sitting Fair   Weight Shift Standing Poor   Comments w/ FWW, min assist needed to maintain L  on walker   Bed Mobility    Sit to Supine Maximal Assist   Scooting Minimal Assist   Comments maxA laying down to the left due to L  neglect. pt attempting to lay straight back on bed.    ADL Assessment   Grooming Minimal Assist;Seated  (oral care, able to brush L side of mouth w/o cues)   Lower Body Dressing Maximal Assist   Toileting   (declined)   Functional Mobility   Sit to Stand Moderate Assist   Bed, Chair, Wheelchair Transfer Moderate Assist   Mobility Chair>ambulate around bed to other side w/ FWW   Visual Perception   Visual Perception  X   Neglect Moderate Left   Visual Scanning Impaired   Comments unable to scan past midline to left   Patient / Family Goals   Patient / Family Goal #1 to go home today   Short Term Goals   Short Term Goal # 1 pt will demo toilet txf with supv   Short Term Goal # 2 pt will dress LB with supv   Short Term Goal # 3 pt will groom in stance at the sink with supv   Short Term Goal # 4 pt will participate in activities to improve LUE function w/ supv   Short Term Goal # 5 pt will locate 3/3 objects to left w/ min cueing

## 2021-06-14 NOTE — PROGRESS NOTES
Hospital Medicine Daily Progress Note    Date of Service  6/14/2021    Chief Complaint  89 y.o. female admitted 6/12/2021 with past medical history of hypertension, cerebral aneurysm, bowel resection, aortic stenosis who presents with confusion and left-sided weakness.    Hospital Course  Patient was not a TPA candidate and NIHSS score was 15. CT head shows hyperdense right MCA with right temporal and inferior frontoparietal lobe edema with slight loss of gray-white differentitation consistent infarct with subtle area of increased attenuation at the junction of the right frontoparietal lobe that could represent some petechial hemorrhage. CT angio head with the right middle cerebral artery M1 occlusion.  IR was consulted and patient was taken for mechanical thrombectomy of right MCA occlusion.  Patient was monitored in the ICU.  MRI of the brain found acute infarction of the right MCA with a small foci of hemorrhagic transformation in the right posterior frontoparietal region.  This hemorrhagic transformation was seen prior to thrombectomy.  Old lacunar infarcts in the right cerebellar hemisphere.    Interval Problem Update  6/14: Patient was downgraded from the ICU today.  Antiplatelets and DVT prophylaxis on hold due to hemorrhagic transformation.  Rehab has evaluated patient and determined that she is a candidate for inpatient rehab.  Cardiac echo is still pending.    Consultants/Specialty  Neurology    Code Status  DNAR/DNI    Disposition  Rehab if insurance approves otherwise SNF    Review of Systems  Review of Systems   Unable to perform ROS: Acuity of condition        Physical Exam  Temp:  [36.5 °C (97.7 °F)-37 °C (98.6 °F)] 36.7 °C (98 °F)  Pulse:  [55-85] 61  Resp:  [16-44] 34  BP: (124-170)/(60-84) 146/75  SpO2:  [92 %-97 %] 96 %    Physical Exam  Vitals and nursing note reviewed.   Constitutional:       General: She is not in acute distress.     Appearance: Normal appearance. She is not ill-appearing,  toxic-appearing or diaphoretic.   HENT:      Head: Normocephalic and atraumatic.      Nose: No congestion or rhinorrhea.      Mouth/Throat:      Pharynx: No oropharyngeal exudate or posterior oropharyngeal erythema.   Eyes:      General: No scleral icterus.  Neck:      Vascular: No carotid bruit.   Cardiovascular:      Rate and Rhythm: Normal rate and regular rhythm.      Pulses: Normal pulses.      Heart sounds: Murmur heard.   No friction rub. No gallop.    Pulmonary:      Effort: Pulmonary effort is normal. No respiratory distress.      Breath sounds: Normal breath sounds. No stridor. No wheezing or rhonchi.   Abdominal:      General: Abdomen is flat. There is no distension.      Palpations: There is no mass.      Tenderness: There is no abdominal tenderness. There is no left CVA tenderness, guarding or rebound.      Hernia: No hernia is present.   Musculoskeletal:         General: No swelling. Normal range of motion.      Cervical back: No rigidity. No muscular tenderness.      Right lower leg: No edema.      Left lower leg: No edema.   Lymphadenopathy:      Cervical: No cervical adenopathy.   Skin:     General: Skin is warm and dry.      Capillary Refill: Capillary refill takes more than 3 seconds.      Coloration: Skin is not jaundiced or pale.      Findings: No bruising or erythema.   Neurological:      Mental Status: She is alert.      Cranial Nerves: Cranial nerve deficit present.      Sensory: Sensory deficit present.      Coordination: Coordination abnormal.      Comments: Left-sided weakness         Fluids    Intake/Output Summary (Last 24 hours) at 6/14/2021 1459  Last data filed at 6/14/2021 1400  Gross per 24 hour   Intake 1223.99 ml   Output 413 ml   Net 810.99 ml       Laboratory  Recent Labs     06/12/21  1831 06/13/21  1014 06/14/21  0330   WBC 14.3* 11.6* 10.3   RBC 3.63* 3.62* 3.16*   HEMOGLOBIN 10.9* 11.0* 9.6*   HEMATOCRIT 33.8* 33.3* 29.1*   MCV 93.1 92.0 92.1   MCH 30.0 30.4 30.4   MCHC  32.2* 33.0* 33.0*   RDW 53.3* 53.3* 53.4*   PLATELETCT 145* 173 151*   MPV 10.8 10.3 11.3     Recent Labs     06/12/21  1831 06/13/21  1014 06/14/21  0330   SODIUM 138 136 136   POTASSIUM 3.8 3.7 3.8   CHLORIDE 106 106 104   CO2 20 19* 22   GLUCOSE 99 111* 94   BUN 20 17 22   CREATININE 0.84 0.78 0.78   CALCIUM 8.2* 8.4* 8.8     Recent Labs     06/12/21  1831   APTT 35.9   INR 1.28*         Recent Labs     06/13/21  0135   TRIGLYCERIDE 49   HDL 48   LDL 69       Imaging  MR-BRAIN-W/O   Final Result      1.  Moderate cerebral atrophy expected for the patient's age.   2.  Moderately extensive areas of acute infarction in the right MCA territory as described above. Small foci of hemorrhagic transformation at the right posterior frontal-parietal region. The most prominent focus measuring about 9 mm would appear to    correspond with the small focus of hemorrhage seen on the CT prior to the thrombectomy procedure.   3.  Moderate supratentorial white matter disease most consistent with microvascular ischemic change.   4.  Old lacunar infarct right cerebellar hemisphere superiorly.   5.  Subcentimeter lacunar size acute infarct at the inferior aspect of the right cerebellar hemisphere.   6.  Variant anatomy with a diminutive vertebrobasilar system and apparent carotid origin of both posterior cerebral arteries.   7.  6 mm aneurysm at the right MCA genu concordant with catheter angiography.      DX-CHEST-PORTABLE (1 VIEW)   Final Result      Cardiomegaly. No focal consolidation or pleural effusions.      IR-THROMBO MECHANICAL ARTERY,INIT   Final Result         89-year-old patient who presented with acute onset of left arm and leg weakness was found to have a right M1 MCA occlusion on CT angiography with a favorable perfusion profile underwent emergent right middle cerebral artery mechanical thrombectomy as    described above. Initial angiogram demonstrated complete occlusion of the right M1 segment.      The final angiogram  showed partial recanalization of the right MCA with improved flow to the superior and inferior division branches. Final recanalization score: TICI 2A      Final angiogram also demonstrated an aneurysm measuring 7 x 8 mm involving the right M1 bifurcation, which was likely the source of the thrombus.         CT-CTA NECK WITH & W/O-POST PROCESSING   Final Result      1.  Mild calcified plaque in the left carotid bulb and proximal left internal carotid artery with less than 50% stenosis.      2.  Soft tissue density in the mediastinum and hilum could represent lymphadenopathy which is incompletely visualized/evaluated on this neck CTA. Recommend follow-up chest CT.      CT-CTA HEAD WITH & W/O-POST PROCESS   Final Result      Right middle cerebral artery M1 occlusion.      Neuro IR is aware of findings.         CT-CEREBRAL PERFUSION ANALYSIS   Final Result      1.  Cerebral blood flow less than 30% likely representing completed infarct = 0 mL.      2.  T Max more than 6 seconds likely representing combination of completed infarct and ischemia = 105 mL.      3.  Mismatched volume likely representing ischemic brain/penumbra = 105 ml      4.  Please note that the cerebral perfusion was performed on the limited brain tissue around the basal ganglia region. Infarct/ischemia outside the CT perfusion sections can be missed in this study.      CT-HEAD W/O   Final Result      1.  Hyperdense right MCA with right temporal and inferior frontoparietal lobe edema with slight loss of gray-white matter differentiation consistent with infarct with subtle area of increased attenuation at the junction of the right frontoparietal lobe    could represent some petechial hemorrhage.      2.  This was discussed with Dr. Baker at 6:55 PM who had already talked to neurology and the neurointerventionist on call.      EC-ECHOCARDIOGRAM COMPLETE W/O CONT    (Results Pending)        Assessment/Plan  * Acute right MCA stroke (HCC)- (present on  admission)  Assessment & Plan  Patient noted to have Right sided M1 occlusion  Status post mechanical thrombectomy on 6/12  Lipitor 40 mg po qhs   Neuro check Q4   Hold antiplatelets and DVT prophylaxis due to hemorrhagic transformation and will need neurology approval before starting it  PT OT eval    Age-related physical debility  Assessment & Plan  PT/OT eval     Hypertension- (present on admission)  Assessment & Plan  Uncontrolled  Start Norvasc 5mg   IV as needed medications have been ordered  Neurology is recommending neuro blood pressure control 120 - 180     Aortic stenosis- (present on admission)  Assessment & Plan  Reports hx of aortic stenosis, monitor cardiac output especially during thrombectomy  TTE        VTE prophylaxis: SCD

## 2021-06-14 NOTE — CARE PLAN
The patient is Watcher - Medium risk of patient condition declining or worsening    Shift Goals  Clinical Goals: Improving neuro exam, -160, post thrombectomy protocol    Progress made toward(s) clinical / shift goals:        Problem: Optimal Care of the Stroke Patient  Goal: Optimal acute care for the stroke patient  Outcome: Progressing     Problem: Knowledge Deficit - Stroke Education  Goal: Patient's knowledge of stroke and risk factors will improve  Outcome: Progressing     Problem: Psychosocial - Patient Condition  Goal: Patient's ability to verbalize feelings about condition will improve  Outcome: Progressing  Goal: Patient's ability to re-evaluate and adapt role responsibilities will improve  Outcome: Progressing     Problem: Discharge Planning - Stroke  Goal: Ensure Stroke Core Measures are met prior to discharge  Outcome: Progressing  Goal: Patient’s continuum of care needs will be met  Outcome: Progressing     Problem: Neuro Status  Goal: Neuro status will remain stable or improve  Outcome: Progressing     Problem: Hemodynamic Monitoring  Goal: Patient's hemodynamics, fluid balance and neurologic status will be stable or improve  Outcome: Progressing     Problem: Respiratory - Stroke Patient  Goal: Patient will achieve/maintain optimum respiratory rate/effort  Outcome: Progressing     Problem: Risk for Aspiration  Goal: Patient's risk for aspiration will be absent or decrease  Outcome: Progressing     Problem: Urinary Elimination  Goal: Establish and maintain regular urinary output  Outcome: Progressing     Problem: Bowel Elimination  Goal: Establish and maintain regular bowel function  Outcome: Progressing     Problem: Mobility - Stroke  Goal: Patient's capacity to carry out activities will improve  Outcome: Progressing  Goal: Spasticity will be prevented or improved  Outcome: Progressing  Goal: Subluxation will be prevented or improved  Outcome: Progressing     Problem: Self Care  Goal: Patient  will have the ability to perform ADLs independently or with assistance (bathe, groom, dress, toilet and feed)  Outcome: Progressing     Problem: Knowledge Deficit - Standard  Goal: Patient and family/care givers will demonstrate understanding of plan of care, disease process/condition, diagnostic tests and medications  Outcome: Progressing     Problem: Skin Integrity  Goal: Skin integrity is maintained or improved  Outcome: Progressing     Problem: Fall Risk  Goal: Patient will remain free from falls  Outcome: Progressing     Problem: Pain - Standard  Goal: Alleviation of pain or a reduction in pain to the patient’s comfort goal  Outcome: Progressing

## 2021-06-14 NOTE — DISCHARGE PLANNING
[facility] Transitional [facility] Coordination    Referral from: Dr. Yang    [facility]  Potential Rehab Diagnosis: MRI with modest R MCA stroke burden with small area of hemorrhagic conversion in R parietal lobe    RRH is not a benefit of the provider.      Physiatry consultation  per protocol.            Thank you for the referral.

## 2021-06-14 NOTE — THERAPY
"Speech Language Pathology  Daily Treatment     Patient Name: Sabiha Bingham  Age:  89 y.o., Sex:  female  Medical Record #: [id number]  Today's Date: 6/14/2021     Precautions  Precautions: Fall Risk, Swallow Precautions ( See Comments)  Comments: L neglect/R gaze preference    Assessment    Patient was seen for a clinical swallow reassessment today. Pt was A&Ox3, stated the year as \"01\" but knew the month. Pt presents with R gaze preference and L inattention/neglect. Oral mech exam was repeated with L facial droop/reduced ROM, lingual deviation to L with reduced L strength/ROM appreciated. Pt was presented with ice chips, mildly thick liquids via 1/2-full tsp/cup, thin liquids via 1/2 tsp, liquidized, pureed and soft/bite sized solids x2. Oral phase was notable for prolonged mastication of soft solids, min anterior bolus loss of liquidized, purees and thin liquids on L to which pt was not sensate. Trace-min L buccal residue was present following intake of liquidized and pureed solids. Pharyngeal swallow response was intermittently delayed with pt needing additional swallows to clear purees, though she did have difficulty initiating a 2nd swallow. Immediate reflexive prolonged coughing occurred with 1/5 ice chips, thin liquids via 1/2 tsp, and soft/bite sized solids, concerning for aspiration. Recommend a diagnostic swallow assessment to objectively assess swallow function and further direct POC, given pt has an acute CVA with overt s/sx of aspiration. However, pt/family are declining at this time as pt has a strong desire to begin eating and would likely not agree to TF anyway. Pt/family agreed to start a Liquidised Diet (LQ3) with Mildly Thick Liquids (MT2) with direct supervision/feeding A. Crush pills in puree. Swallow precautions and ways to mitigate risk of aspiration PNA discussed with pt/family, including importance of oral care, mobilization w/ therapy and nursing as appropriate, sitting upright for meals, " "small/bites sips, check for L pocketing and clear.     Plan    Liquidised Diet (LQ3) with Mildly Thick Liquids (MT2) with direct supervision/feeding A.   Crush pills in puree.  Oral care after meals.    Continue current treatment plan.    Discharge Recommendations: Recommend post-acute placement for additional speech therapy services prior to discharge home    Subjective    \"I'm hungry and thirsty.\"     Objective       06/14/21 1208   Cognitive-Linguistic   Level of Consciousness Alert   Orientation Level Not Oriented to Year   Dysphagia    Positioning / Behavior Modification Modulate Rate or Bite Size;Self Monitoring;Multiple Swallows   Oral / Pharyngeal / Laryngeal Exercises Pharyngeal Constriction Exercises   Other Treatments PO trials of ice chips, mildly thick liquids via tsp/cup, thins via 1/2 tsp, liquidized, pureed, soft/bite sized solids   Diet / Liquid Recommendation Liquidised (3) - (Nectar Thick Full Liquid);Mildly Thick (2) - (Nectar Thick)  (ok for single ice chips b/w meals for oral comfort)   Recommended Route of Medication Administration   Medication Administration  Crush all Medications in Puree  (in applesauce, not pudding)   Patient / Family Goals   Patient / Family Goal #1 6/14: \"I'm hungry and I'm thirsty.\"   Short Term Goals   Short Term Goal # 1 The patient will consume prefeeding trials without signs of aspiration given 1:1 feeding and cueing by SLP   Goal Outcome # 1 Goal met, new goal added   Short Term Goal # 1 B  Patient will consume meals of liquidized solids/mildly thick liquids with no s/sx of aspiration given direct meal supervision and feeding A as needed.    Short Term Goal # 2 Patient will complete swallowing exercises targeting L facial, lingual, BoT and pharyngeal weakness x15 reps per tx with mod cues.         "

## 2021-06-14 NOTE — PROGRESS NOTES
"Neurology Progress Note  Neurohospitalist Service, [facility]    Referring Physician: Jonas Coppola D.O.      Interval History: No acute events, wishing to go home this AM.  MRI with modest R MCA stroke burden with small area of hemorrhagic conversion in R parietal lobe    Review of systems: In addition to what is detailed in the HPI and/or updated in the interval history, all other systems reviewed and are negative.    Past Medical History, Past Surgical History and Social History reviewed and unchanged from prior    Medications:    Current Facility-Administered Medications:   •  hydrALAZINE (APRESOLINE) injection 10 mg, 10 mg, Intravenous, Q2HRS PRN, Tong Schrader M.D.  •  labetalol (NORMODYNE/TRANDATE) injection 10 mg, 10 mg, Intravenous, Q10 MIN PRN, Tong Schrader M.D.  •  atorvastatin (LIPITOR) tablet 40 mg, 40 mg, Oral, Q EVENING, Cullen Yang M.D.  •  [Held by provider] aspirin (ASA) chewable tab 81 mg, 81 mg, Oral, DAILY, Cullen Yang M.D.    Physical Examination:   /60   Pulse (!) 59   Temp 36.7 °C (98.1 °F) (Temporal)   Resp (!) 22   Ht 1.651 m (5' 5\")   Wt 47.9 kg (105 lb 9.6 oz)   SpO2 93%   BMI 17.57 kg/m²       General: Awake sitting in chair  Neck: There is normal range of motion  CV: Regular rate   Extremities:  Warm, dry, and intact, without peripheral lower extremity edema    NEUROLOGICAL EXAM:     Mental status: Sitting in chair, alert and fully oriented, follows commands  Speech and language: Speech is clear and fluent. The patient is able to name and repeat.  Cranial nerve exam: Visual fields are full. There is no nystagmus. Extraocular muscles are intact, but there is a R gaze preference. Face appears symmetric. Tongue is midline.  Motor exam: RUE/RLE are sustained antigravity.  LUE is antigravity with drift to bed- requires constant encouragement to get antigravity.  LLE with antigravity strength with slight drift.  Sensory exam:  Reacts " to tactile in all 4 extremities, there is neglect to double stim on L  Coordination: No ataxia on RUE FTN testing      NIHSS: National Institutes of Health Stroke Scale    [1] 1a:Level of Consciousness    0-alert 1-drowsy   2-stupor   3-coma  [0] 1b:LOC Questions                  0-both  1-one      2-neither  [0] 1c:LOC Commands                   0-both  1-one      2-neither  [1] 2: Best Gaze                     0-nl    1-partial  2-forced  [0] 3: Visual Fields                   0-nl    1-partial  2-complete 3-bilat  [0] 4: Facial Paresis                0-nl    1-minor    2-partial  3-full  MOTOR                       0-nl  [0] 5: Right Arm           1-drift  [2] 6: Left Arm             2-some effort vs gravity  [0] 7: Right Leg           3-no effort vs gravity  [1] 8: Left Leg             4-no movement                             x-untestable  [0] 9: Limb Ataxia                    0-abs   1-1_limb   2-2+_limbs       x-untestable  [0] 10:Sensory                        0-nl    1-partial  2-dense  [0] 11:Best Language/Aphasia         0-nl    1-mild/mod 2-severe   3-mute  [0] 12:Dysarthria                     0-nl    1-mild/mod 2-severe       x-untestable  [1] 13:Neglect/Inattention            0-none  1-partial  2-complete  [6] TOTAL      Objective Data:    Labs:  Lab Results   Component Value Date/Time    PROTHROMBTM 15.7 (H) 06/12/2021 06:31 PM    INR 1.28 (H) 06/12/2021 06:31 PM      Lab Results   Component Value Date/Time    WBC 10.3 06/14/2021 03:30 AM    RBC 3.16 (L) 06/14/2021 03:30 AM    HEMOGLOBIN 9.6 (L) 06/14/2021 03:30 AM    HEMATOCRIT 29.1 (L) 06/14/2021 03:30 AM    MCV 92.1 06/14/2021 03:30 AM    MCH 30.4 06/14/2021 03:30 AM    MCHC 33.0 (L) 06/14/2021 03:30 AM    MPV 11.3 06/14/2021 03:30 AM    NEUTSPOLYS 79.30 (H) 06/14/2021 03:30 AM    LYMPHOCYTES 9.20 (L) 06/14/2021 03:30 AM    MONOCYTES 10.10 06/14/2021 03:30 AM    EOSINOPHILS 0.40 06/14/2021 03:30 AM    BASOPHILS 0.50 06/14/2021 03:30 AM      Lab  Results   Component Value Date/Time    SODIUM 136 06/14/2021 03:30 AM    POTASSIUM 3.8 06/14/2021 03:30 AM    CHLORIDE 104 06/14/2021 03:30 AM    CO2 22 06/14/2021 03:30 AM    GLUCOSE 94 06/14/2021 03:30 AM    BUN 22 06/14/2021 03:30 AM    CREATININE 0.78 06/14/2021 03:30 AM      Lab Results   Component Value Date/Time    CHOLSTRLTOT 127 06/13/2021 01:35 AM    LDL 69 06/13/2021 01:35 AM    HDL 48 06/13/2021 01:35 AM    TRIGLYCERIDE 49 06/13/2021 01:35 AM       Lab Results   Component Value Date/Time    ALKPHOSPHAT 61 06/12/2021 06:31 PM    ASTSGOT 25 06/12/2021 06:31 PM    ALTSGPT 14 06/12/2021 06:31 PM    TBILIRUBIN 0.8 06/12/2021 06:31 PM        Imaging/Testing:    I interpreted and/or reviewed the patient's neuroimaging    MR-BRAIN-W/O   Final Result      1.  Moderate cerebral atrophy expected for the patient's age.   2.  Moderately extensive areas of acute infarction in the right MCA territory as described above. Small foci of hemorrhagic transformation at the right posterior frontal-parietal region. The most prominent focus measuring about 9 mm would appear to    correspond with the small focus of hemorrhage seen on the CT prior to the thrombectomy procedure.   3.  Moderate supratentorial white matter disease most consistent with microvascular ischemic change.   4.  Old lacunar infarct right cerebellar hemisphere superiorly.   5.  Subcentimeter lacunar size acute infarct at the inferior aspect of the right cerebellar hemisphere.   6.  Variant anatomy with a diminutive vertebrobasilar system and apparent carotid origin of both posterior cerebral arteries.   7.  6 mm aneurysm at the right MCA genu concordant with catheter angiography.      DX-CHEST-PORTABLE (1 VIEW)   Final Result      Cardiomegaly. No focal consolidation or pleural effusions.      CT-CTA NECK WITH & W/O-POST PROCESSING   Final Result      1.  Mild calcified plaque in the left carotid bulb and proximal left internal carotid artery with less than  50% stenosis.      2.  Soft tissue density in the mediastinum and hilum could represent lymphadenopathy which is incompletely visualized/evaluated on this neck CTA. Recommend follow-up chest CT.      CT-CTA HEAD WITH & W/O-POST PROCESS   Final Result      Right middle cerebral artery M1 occlusion.      Neuro IR is aware of findings.         CT-CEREBRAL PERFUSION ANALYSIS   Final Result      1.  Cerebral blood flow less than 30% likely representing completed infarct = 0 mL.      2.  T Max more than 6 seconds likely representing combination of completed infarct and ischemia = 105 mL.      3.  Mismatched volume likely representing ischemic brain/penumbra = 105 ml      4.  Please note that the cerebral perfusion was performed on the limited brain tissue around the basal ganglia region. Infarct/ischemia outside the CT perfusion sections can be missed in this study.      CT-HEAD W/O   Final Result      1.  Hyperdense right MCA with right temporal and inferior frontoparietal lobe edema with slight loss of gray-white matter differentiation consistent with infarct with subtle area of increased attenuation at the junction of the right frontoparietal lobe    could represent some petechial hemorrhage.      2.  This was discussed with Dr. Baker at 6:55 PM who had already talked to neurology and the neurointerventionist on call.      IR-THROMBO MECHANICAL ARTERY,INIT    (Results Pending)   EC-ECHOCARDIOGRAM COMPLETE W/O CONT    (Results Pending)       Assessment and Plan:  Ángel Jordan is a 89 year old woman presenting with L side weakness, slurred speech, found to have R M1 occlusion, not a candidate for tPA as outside time window, but did undergo endovascular clot retrieval with TICI 2B reperfusion on 6/12.  Post-op BP goal 160-180 given history of aortic stenosis, and to mitigate reperfusion injury.  Improved exam post-operatively. MRI with modest stroke burden, with a small foci of hemorrhagic conversion.  Ok to  restart antithrombotic therapy.    Will continue with liberal BP goal given her aortic stenosis.    Problem list:  1.  R MCA stroke s/p endovascular clot retrieval of R M1 with TICI 2B reperfusion  2.  Hypertension  3.  Aortic stenosis  4.  R MCA 8mm saccular aneurysm    Plan:   - q4h neurochecks ok   - start plavix 75 mg daily   - stroke labs:  HgbA1c and LDL 69   - may defer statin therapy given advance age and LDL at goal < 70   - ok with long-term, liberal BP goal given advance age and history of aortic stenosis- SBP goal 120-180 may be appropriate   - PT/OT/SLP    - will need GOC/palliative consult if future care, and long-term care planning if deficits prevent her from returning to independent living   - if within GOC, consider TTE and long-term cardiac monitoring to complete embolic workup   - for 8mm R MCA aneurysm- no treatment or surveillance indicated given advance age   - ok to start lovenox SQ for DVT ppx    The evaluation of the patient, and recommended management, was discussed with the ICU team. I have performed a physical exam and reviewed and updated ROS and Plan today (6/14/2021).     Tong Cai MD  Neurohospitalist, Acute Care Services

## 2021-06-14 NOTE — THERAPY
Physical Therapy   Initial Evaluation     Patient Name: Sabiha Bingham  Age:  89 y.o., Sex:  female  Medical Record #: [id number]  Today's Date: 6/14/2021     Precautions: Fall Risk, Swallow Precautions    Assessment  Patient is a 89 y.o. female admitted with R MCA stroke s/p thrombectomy. Pt seen for PT evaluation at this time. Pt presents with L sided neglect and does not track past midline. Pt with inability to identify light touch or pressure to LLE. BLE strength intact and equal. Pt requires min-mod A for mobility, was able to ambulate 10ft with FWW, assist to maintain L , assist to weight shift to R and maintain midline d/t L lean. Pt will benefit from acute PT to progress mobility, up to chair with nursing, and postacute intensive therapy prior to return home. Pt previously active and independent, family and friend support available. Will follow.     Plan  Recommend Physical Therapy 5 times per week until therapy goals are met for the following treatments:  Bed Mobility, Gait Training, Neuro Re-Education / Balance, Self Care/Home Evaluation, Stair Training, Therapeutic Activities and Therapeutic Exercises  DC Equipment Recommendations: Unable to determine at this time  Discharge Recommendations: Recommend post-acute placement for additional physical therapy services prior to discharge home (recommend PMR consult)          06/14/21 1127   Prior Living Situation   Prior Services Intermittent Physical Support for ADL Per Family;Housekeeping / Homemaker Services   Housing / Facility 1 Story House   Steps Into Home 2   Steps In Home 0   Rail Both Rail (Steps into Home)   Bathroom Set up Bathtub / Shower Combination;Grab Bars   Equipment Owned Single Point Cane   Lives with -  Alone and Able to Care For Self   Comments granddaughter present and reports lives close by and assists as needed. reports friends assist with IADLs. pt very independent PTA.    Prior Level of Functional Mobility   Bed Mobility Independent    Transfer Status Independent   Ambulation Independent   Distance Ambulation (Feet) limited community   Assistive Devices Used None   Stairs Independent   Comments has 3 canes in her home but does not typically use them   Cognition    Speech/ Communication Delayed Responses;Hard of Hearing   Level of Consciousness Alert   Ability To Follow Commands 1 Step   Attention Impaired   Sequencing Impaired   Comments pleasant and motivated. eager to return home. L neglect   Strength Lower Body   Comments strength B and WFL   Sensation Lower Body   Comments absent light touch and sensation to LLE, unable to identify. did feel touch correctly to L 5th toe only.    Neurological Concerns   Standing Posture During ADL's Lateral Lean Left   Vision   Vision Comments L neglect, does not track past midline, can turn head L but does not maintain, tends to look up above eye level   Balance Assessment   Sitting Balance (Static) Fair -   Sitting Balance (Dynamic) Poor +   Standing Balance (Static) Poor +   Standing Balance (Dynamic) Poor   Weight Shift Sitting Fair   Weight Shift Standing Poor   Comments standing with FWW, lateral L lean, assist to maintain L  on walker   Gait Analysis   Gait Level Of Assist Minimal Assist   Assistive Device Front Wheel Walker   Distance (Feet) 10   Deviation Bradykinetic;Step To;Decreased Base Of Support;Decreased Heel Strike;Decreased Toe Off (L lean)   Weight Bearing Status no restrictions   Comments no overt LOB but assist required to maintain midline posture. cues for FWW management and foot positioning, noticeable LLE fatigue towards end   Bed Mobility    Sit to Supine Maximal Assist   Scooting Minimal Assist   Comments max A d/t L neglect and inability to position safely into bed   Functional Mobility   Sit to Stand Moderate Assist   Bed, Chair, WC Transfer Moderate Assist   Short Term Goals    Short Term Goal # 1 pt will perform supine <> sit without bed features with SPV in 6 visits to be  able to get in/out of bed at home   Short Term Goal # 2 pt will perform all functional xfrs with SPV in 6 visits for improved independence   Short Term Goal # 3 pt will ambulate 150ft with FWW and SPV in 6 visits to access home   Short Term Goal # 4 pt will negotiate 2 steps with SPV in 6 visits to access home

## 2021-06-14 NOTE — CARE PLAN
The patient is Watcher      Shift Goals  Clinical Goals: Improving neuro exam, -180, post thrombectomy protocol  Patient Goals: MICHEAL, fatigue  Family Goals: MICHEAL    Progress made toward(s) clinical / shift goals: increased movement and awareness of left arm. Blood pressure goals met without vasopressors at end of day. Neuro checks and vital signs per stroke core measures.    Patient is not progressing towards the following goals:

## 2021-06-14 NOTE — DISCHARGE PLANNING
Anticipated Discharge Disposition: TBD-IRF vs SNF    Action:   Chart review completed.   -PT OT recommending IRF  -Pt's insurance is contracted with [facility]   CM placed call to patient's granddaughter April to discuss discharge plan/rehab referral, VM left.    Barriers to Discharge:   Medical clearance  Acute rehab referral pending choice/acceptance    Plan: Care coordination to follow up with patient's granddaughter to discuss rehab choice.      Addendum:  1415-Received call back from patient's granddaughter April. April in agreement with discharge plan and gave verbal auth to send referral to [facility]. April states she will discuss the discharge plan with patient and her brother (pt's grandson).  Choice form sent to [facility].  Care coordination will follow up on referral and keep family informed of discharge plan.

## 2021-06-14 NOTE — PROGRESS NOTES
Notified Dr. Schrader of pt's hemorrhagic conversion on MRI. Inquired about continuing SBP goal of 160-180 as SBP has been 140-150s and about to restart Kevin. Dr. Schrader to look at chart and get back to me.    Addendum 21:05: Per Dr. Schrader, keep -160 and notify neurology for input.    21:12: Paged on-call neurohospitalist    Dr. Oswald returned call and agreed 120-160 okay as long as neurological status doesn't decline with lower BP's.

## 2021-06-14 NOTE — CARE PLAN
" The patient is Stable - Low risk of patient condition declining or worsening    Shift Goals  Clinical Goals: Increased mobility, increased use of left side, -140  Patient Goals: \"Go home\"  Family Goals: MICHEAL    Progress made toward(s) clinical / shift goals:  Increased mobility; re-evaluation by SLP and approval for modified diet; seen by PT/OT and ambulated with assistance. Increasingly alert. Improved mobility and use of left side. Post-thrombectomy protocols in place.     Patient is not progressing towards the following goals:  Problem: Optimal Care of the Stroke Patient  Goal: Optimal acute care for the stroke patient  Outcome: Progressing     Problem: Knowledge Deficit - Stroke Education  Goal: Patient's knowledge of stroke and risk factors will improve  Outcome: Progressing     Problem: Respiratory - Stroke Patient  Goal: Patient will achieve/maintain optimum respiratory rate/effort  Outcome: Progressing     Problem: Dysphagia  Goal: Dysphagia will improve  Outcome: Progressing     Problem: Mobility - Stroke  Goal: Patient's capacity to carry out activities will improve  Outcome: Progressing        "

## 2021-06-14 NOTE — PROGRESS NOTES
Notified Dr. Gonda of patient's decreasing UOP. 250 ml LR bolus ordered. CBC and BMP ordered.    Addendum 04:05: Updated Dr. Gonda pt had 30 ml out between 0200 and 0400 after bolus. No new orders at this time.

## 2021-06-14 NOTE — PROGRESS NOTES
Ms. Bingham is a 89-year-old female with PMH significant for HTN and aortic stenosis who was admitted 6/12/2021 for right M1 occlusion status post thrombectomy with TICI 2A/2B, also noted 8 mm right MCA bifurcation aneurysm.    Left-sided weakness, improved from yesterday. LUE/LLE now 2/5. Still some left-sided neglect, but improving. More alert today, asking to go home. Disoriented to situation/events.     No longer requires ICU care.  Transfer to Neuro floor.  Discussed with Hospitalist, Dr. GENO Garibay, who will assume care.  Critical care service is available for any questions or concerns.    Please note that this dictation was created using voice recognition software. I have made every reasonable attempt to correct obvious errors, but there may be errors of grammar and possibly content that I did not discover before finalizing the note.    BOSTON Marie.

## 2021-06-14 NOTE — CONSULTS
"    Physical Medicine and Rehabilitation Consultation         Initial Consult      Initial Consultation Date: 6/14/2021  Consulting provider: Dr. Yang  Reason for consultation: assess for acute inpatient rehab appropriateness  LOS: 2 Day(s)      Chief complaint: stroke        HPI:   The patient is a 89 y.o. female with a past medical history of HTN, cerebral aneurysm, aortic stenosis;  who presented on 6/12/2021  6:29 PM with left hemiplegia, aphasia, not a candidate for TPA due to timing. Right M1 occlusion s/p IR mechanical thrombectomy on 6/12, TICI 2A/2B.  Also noted to have right MCA bifurcation aneurysm.      Social Hx:  Pre-morbidly, this patient lived in:   1 story home  With 2 steps to enter  Lives independently, granddaughter lives nearby can assist as needed        Current level of function:   PT, 6/14: Min A gait 10 ft FWW; Min A scooting; Max A STS; Mod A transfers  OT, 6/14: Max A STS; Min A Scooting; Min A grooming; Max A LBD; Mod A transfers  SLP, 6/13: dysphagia training; NPO        PMH:  Past Medical History:   Diagnosis Date   • Aneurysm ([facility]) 2000    cranial   • SBO (small bowel obstruction) ([facility]) 2013         PSH:  History reviewed. No pertinent surgical history.      FHX: Reviewed.  History reviewed. No pertinent family history.          Medications:  Current Facility-Administered Medications   Medication Dose   • hydrALAZINE (APRESOLINE) injection 10 mg  10 mg   • labetalol (NORMODYNE/TRANDATE) injection 10 mg  10 mg   • atorvastatin (LIPITOR) tablet 40 mg  40 mg   • [Held by provider] aspirin (ASA) chewable tab 81 mg  81 mg       Allergies:  Allergies   Allergen Reactions   • Pine      Seasonal allergies to pine pollen         Vitals: /75   Pulse (!) 59   Temp 36.7 °C (98 °F)   Resp (!) 34   Ht 1.651 m (5' 5\")   Wt 47.9 kg (105 lb 9.6 oz)   SpO2 95%         Labs: Reviewed and significant for 6/14: Hgb 9.6, Na 136, Cr 0.78  Recent Labs     06/12/21  1831 06/13/21  1014 " 06/14/21  0330   RBC 3.63* 3.62* 3.16*   HEMOGLOBIN 10.9* 11.0* 9.6*   HEMATOCRIT 33.8* 33.3* 29.1*   PLATELETCT 145* 173 151*   PROTHROMBTM 15.7*  --   --    APTT 35.9  --   --    INR 1.28*  --   --      Recent Labs     06/12/21  1831 06/13/21  1014 06/14/21  0330   SODIUM 138 136 136   POTASSIUM 3.8 3.7 3.8   CHLORIDE 106 106 104   CO2 20 19* 22   GLUCOSE 99 111* 94   BUN 20 17 22   CREATININE 0.84 0.78 0.78   CALCIUM 8.2* 8.4* 8.8     Recent Results (from the past 24 hour(s))   Basic Metabolic Panel    Collection Time: 06/14/21  3:30 AM   Result Value Ref Range    Sodium 136 135 - 145 mmol/L    Potassium 3.8 3.6 - 5.5 mmol/L    Chloride 104 96 - 112 mmol/L    Co2 22 20 - 33 mmol/L    Glucose 94 65 - 99 mg/dL    Bun 22 8 - 22 mg/dL    Creatinine 0.78 0.50 - 1.40 mg/dL    Calcium 8.8 8.5 - 10.5 mg/dL    Anion Gap 10.0 7.0 - 16.0   CBC WITH DIFFERENTIAL    Collection Time: 06/14/21  3:30 AM   Result Value Ref Range    WBC 10.3 4.8 - 10.8 K/uL    RBC 3.16 (L) 4.20 - 5.40 M/uL    Hemoglobin 9.6 (L) 12.0 - 16.0 g/dL    Hematocrit 29.1 (L) 37.0 - 47.0 %    MCV 92.1 81.4 - 97.8 fL    MCH 30.4 27.0 - 33.0 pg    MCHC 33.0 (L) 33.6 - 35.0 g/dL    RDW 53.4 (H) 35.9 - 50.0 fL    Platelet Count 151 (L) 164 - 446 K/uL    MPV 11.3 9.0 - 12.9 fL    Neutrophils-Polys 79.30 (H) 44.00 - 72.00 %    Lymphocytes 9.20 (L) 22.00 - 41.00 %    Monocytes 10.10 0.00 - 13.40 %    Eosinophils 0.40 0.00 - 6.90 %    Basophils 0.50 0.00 - 1.80 %    Immature Granulocytes 0.50 0.00 - 0.90 %    Nucleated RBC 0.00 /100 WBC    Neutrophils (Absolute) 8.19 (H) 2.00 - 7.15 K/uL    Lymphs (Absolute) 0.95 (L) 1.00 - 4.80 K/uL    Monos (Absolute) 1.04 (H) 0.00 - 0.85 K/uL    Eos (Absolute) 0.04 0.00 - 0.51 K/uL    Baso (Absolute) 0.05 0.00 - 0.12 K/uL    Immature Granulocytes (abs) 0.05 0.00 - 0.11 K/uL    NRBC (Absolute) 0.00 K/uL   ESTIMATED GFR    Collection Time: 06/14/21  3:30 AM   Result Value Ref Range    GFR If African American >60 >60 mL/min/1.73 m  2    GFR If Non African American >60 >60 mL/min/1.73 m 2           Imaging:  MR-BRAIN-W/O  Result Date: 6/13/2021 6/13/2021 4:58 PM HISTORY/REASON FOR EXAM:  Stroke, follow up. Status post right MCA M1 occlusion, suction thrombectomy TECHNIQUE/EXAM DESCRIPTION: MRI of the brain without contrast. T1 sagittal, T2 fast spin-echo axial, T1 coronal, FLAIR coronal, Diffusion weighted and Apparent Diffusion Coefficient (ADC map) axial images were obtained of the whole brain. Additional FLAIR axial images were obtained. The study was performed on a [employer] 1.5 Rosario MRI scanner. COMPARISON:  Head CT 6/12/2021 FINDINGS: Calvaria are unremarkable. There are no extra-axial fluid collections. There is a pattern of moderate cerebral atrophy manifest as enlargement of sulcal markings over the convexities and vertex along with ventriculomegaly. Age-appropriate for the patient's advanced age of 89 years. There are moderate areas of confluent and gyriform T2 and FLAIR hyperintensity in the right hemisphere involving the mid and posterior right temporal lobe, right insular cortex, right posterior frontal/parietal convexity. There is corresponding restricted diffusion in these areas. There is sparing of a large portion of right frontal and parietal high convexities. The head of caudate nucleus, basal ganglia, and corona radiata are spared. There are a few small foci of hemorrhagic transformation in the right posterior frontal-parietal region. A 9 mm focus in the right parietal lobe appears to correspond to the small area of hemorrhagic density seen on the CT prior to the thrombectomy procedure (gradient echo axial image 17, series 5, CT axial image 37, series 3 from 6/12/2021). Elsewhere in the cerebral hemispheres, there is a pattern of moderate chronic supratentorial white matter disease with patchy and focal areas of bright T2 and FLAIR signal in the subcortical and deep white matter of both hemispheres consistent with small   vessel ischemic change versus demyelination or gliosis. There are no areas of acute infarction in the left cerebral hemisphere. The brainstem and posterior fossa structures show an old lacunar infarct at the superior aspect of the right cerebellar hemisphere (T2 axial image 15, series 6). There is a subcentimeter focus of acute infarction at the inferior aspect of the right cerebellar hemisphere (diffusion-weighted axial image 7, series 4). No acute infarction involving the brainstem. Vascular flow voids show a diminutive vertebrobasilar system. There appears to be carotid origin of both posterior cerebral arteries. At the right MCA genu, there is an approximately 6 mm aneurysm which was identified at the time of angiography/thrombectomy (T2 axial image 17, series 6). The dural venous sinuses appear intact. The paranasal sinuses in the field-of-view are clear. The mastoids are clear. There has been cataract surgery.     1.  Moderate cerebral atrophy expected for the patient's age. 2.  Moderately extensive areas of acute infarction in the right MCA territory as described above. Small foci of hemorrhagic transformation at the right posterior frontal-parietal region. The most prominent focus measuring about 9 mm would appear to correspond with the small focus of hemorrhage seen on the CT prior to the thrombectomy procedure. 3.  Moderate supratentorial white matter disease most consistent with microvascular ischemic change. 4.  Old lacunar infarct right cerebellar hemisphere superiorly. 5.  Subcentimeter lacunar size acute infarct at the inferior aspect of the right cerebellar hemisphere. 6.  Variant anatomy with a diminutive vertebrobasilar system and apparent carotid origin of both posterior cerebral arteries. 7.  6 mm aneurysm at the right MCA genu concordant with catheter angiography.              ASSESSMENT:  Patient is a 89 y.o. female admitted with left hemiplegia, aphasia, not a candidate for TPA due to timing.  "Right M1 occlusion s/p IR mechanical thrombectomy on 6/12, TICI 2A/2B.      #Rehab:   -Vitals: intermittent bradycardia, 1L NC  -Insurance: [employer] (not contracted with [facility])  -Discharge support: Grandchildren can assist  -Rehab Impairment Code: 0001.1 - Stroke: Left Body Involvement (Right Brain)  -Reason for admission: Acute right MCA stroke (HCC)  -When medically cleared, recommend referral to an [facility] for inpatient rehab, based on her insurance    #Neuro: Right M1 occlusion s/p IR mechanical thrombectomy on 6/12, TICI 2A/2B.  Also noted to have right MCA bifurcation 8mm aneurysm  -plavix; can defer statins   -SBP goal 120 to 180, given age and aortic stenosis  -Aneurysm: “no treatment or surveillance” given age    #CV: HTN, see \"Neuro\"    #Thrombocytopenia: Plt 151 on 6/14     #Bowel: none since admit 1d ago    #Bladder: galindo removed 6/14, pending voiding      #DVT PPX: SCDs            Thank you for allowing us to participate in the care of this patient.             José Miguel Plata MD  Physical Medicine and Rehabilitation   6/14/2021          "

## 2021-06-14 NOTE — DISCHARGE PLANNING
Received Choice form at [id number]  Agency/Facility Name: [facility]   Referral sent per Choice form @ [phone]

## 2021-06-14 NOTE — PROGRESS NOTES
Radiology Progress Note   Author: KAY Crowell Date & Time created: 6/14/2021  8:36 AM   Date of admission  6/12/2021    Chief Complaint  89 y.o. female admitted 6/12/2021 with stroke     Interval History:  89F high functioning woman presenting with R MCA syndrome, with presence of R M1 occlusion with favorable perfusion profile.  Taken to cath lab with Dr. Pierre for endovascular clot retrieval- TICI 2A/2B reperfusion attained.  Has a 8mm R MCA bifurcation aneurysm.  Post operative exam improved.     Interval Problem Update  IR  6/12- Cerebral angiogram with mechanical thrombectomy by Dr. Pierre     Past Medical History:   Diagnosis Date    Aneurysm ([facility]) 2000    cranial    SBO (small bowel obstruction) ([facility]) 2013       History reviewed. No pertinent surgical history.    Current Facility-Administered Medications   Medication Dose Route Frequency Provider Last Rate Last Admin    hydrALAZINE (APRESOLINE) injection 10 mg  10 mg Intravenous Q2HRS PRN Tong Schrader M.D.        labetalol (NORMODYNE/TRANDATE) injection 10 mg  10 mg Intravenous Q10 MIN PRN Tong Schrader M.D.        atorvastatin (LIPITOR) tablet 40 mg  40 mg Oral Q EVENING Cullen Yang M.D.        [Held by provider] aspirin (ASA) chewable tab 81 mg  81 mg Oral DAILY Cullen Yang M.D.           Social History     Socioeconomic History    Marital status:      Spouse name: Not on file    Number of children: Not on file    Years of education: Not on file    Highest education level: Not on file   Occupational History    Not on file   Tobacco Use    Smoking status: Not on file   Substance and Sexual Activity    Alcohol use: Not on file    Drug use: Not on file    Sexual activity: Not on file   Other Topics Concern    Not on file   Social History Narrative    Not on file     Social Determinants of Health     Financial Resource Strain:     Difficulty of Paying Living Expenses:    Food Insecurity:     Worried About Running Out  of Food in the Last Year:     Ran Out of Food in the Last Year:    Transportation Needs:     Lack of Transportation (Medical):     Lack of Transportation (Non-Medical):    Physical Activity:     Days of Exercise per Week:     Minutes of Exercise per Session:    Stress:     Feeling of Stress :    Social Connections:     Frequency of Communication with Friends and Family:     Frequency of Social Gatherings with Friends and Family:     Attends [employer] Services:     Active Member of Clubs or Organizations:     Attends Club or Organization Meetings:     Marital Status:    Intimate Partner Violence:     Fear of Current or Ex-Partner:     Emotionally Abused:     Physically Abused:     Sexually Abused:        History reviewed. No pertinent family history.    ROS  Review of Systems   Constitutional: Negative for chills and fever.   HENT: Negative for sinus pain.    Eyes: Negative for blurred vision, double vision and pain.   Respiratory: Negative for cough and hemoptysis.    Cardiovascular: Negative for chest pain and palpitations.   Gastrointestinal: Negative for abdominal pain and constipation.   Genitourinary: Negative for dysuria.   Musculoskeletal: Negative for myalgias.   Skin: Negative for rash.   Neurological: Positive for weakness. Negative for dizziness.   Endo/Heme/Allergies: Does not bruise/bleed easily.   Psychiatric/Behavioral: Negative for depression.        Physical Exam   Physical Exam  Constitutional:       Appearance: Normal appearance. She is not ill-appearing.      Comments: Sitting in chair      HENT:      Head: Normocephalic.      Nose: Nose normal.      Mouth/Throat:      Mouth: Mucous membranes are moist.   Eyes:      Pupils: Pupils are equal, round, and reactive to light.      Comments: Visual fields deficits, Left eye     Cardiovascular:      Rate and Rhythm: Rhythm irregular.      Heart sounds: Murmur heard.     Pulmonary:      Effort: Pulmonary effort is normal.      Breath sounds: Normal  breath sounds.   Abdominal:      General: Abdomen is flat.      Palpations: Abdomen is soft.   Musculoskeletal:         General: Normal range of motion.      Cervical back: Normal range of motion.      Comments: Left arm drift  Left leg drift    Skin:     General: Skin is warm and dry.      Coloration: Skin is pale.   Neurological:      Mental Status: She is confused.      Cranial Nerves: Facial asymmetry present.      Motor: Weakness present.      Coordination: Coordination abnormal. Heel to Uriarte Test abnormal.      Comments: Left sided deficits    Psychiatric:         Mood and Affect: Mood normal.       No Significant changes from previous ROS/ PE, please see previous note for details     Vital Signs  Blood Pressure : 146/75   Temperature: 36.7 °C (98 °F)   Pulse: 73   Respiration: (!) 39   Pulse Oximetry: 94 %     Laboratory  Recent Labs     06/12/21 1831 06/13/21  1014 06/14/21  0330   WBC 14.3* 11.6* 10.3   RBC 3.63* 3.62* 3.16*   HEMOGLOBIN 10.9* 11.0* 9.6*   HEMATOCRIT 33.8* 33.3* 29.1*   MCV 93.1 92.0 92.1   MCH 30.0 30.4 30.4   MCHC 32.2* 33.0* 33.0*   RDW 53.3* 53.3* 53.4*   PLATELETCT 145* 173 151*   MPV 10.8 10.3 11.3     Recent Labs     06/12/21 1831 06/13/21  1014 06/14/21  0330   SODIUM 138 136 136   POTASSIUM 3.8 3.7 3.8   CHLORIDE 106 106 104   CO2 20 19* 22   GLUCOSE 99 111* 94   BUN 20 17 22   CREATININE 0.84 0.78 0.78   CALCIUM 8.2* 8.4* 8.8     Recent Labs     06/12/21 1831   APTT 35.9   INR 1.28*     Recent Labs     06/12/21 1831   ASTSGOT 25   ALTSGPT 14   TBILIRUBIN 0.8   ALKPHOSPHAT 61   GLOBULIN 2.9   INR 1.28*       Imaging  MR-BRAIN-W/O   Final Result      1.  Moderate cerebral atrophy expected for the patient's age.   2.  Moderately extensive areas of acute infarction in the right MCA territory as described above. Small foci of hemorrhagic transformation at the right posterior frontal-parietal region. The most prominent focus measuring about 9 mm would appear to    correspond with  the small focus of hemorrhage seen on the CT prior to the thrombectomy procedure.   3.  Moderate supratentorial white matter disease most consistent with microvascular ischemic change.   4.  Old lacunar infarct right cerebellar hemisphere superiorly.   5.  Subcentimeter lacunar size acute infarct at the inferior aspect of the right cerebellar hemisphere.   6.  Variant anatomy with a diminutive vertebrobasilar system and apparent carotid origin of both posterior cerebral arteries.   7.  6 mm aneurysm at the right MCA genu concordant with catheter angiography.      DX-CHEST-PORTABLE (1 VIEW)   Final Result      Cardiomegaly. No focal consolidation or pleural effusions.      CT-CTA NECK WITH & W/O-POST PROCESSING   Final Result      1.  Mild calcified plaque in the left carotid bulb and proximal left internal carotid artery with less than 50% stenosis.      2.  Soft tissue density in the mediastinum and hilum could represent lymphadenopathy which is incompletely visualized/evaluated on this neck CTA. Recommend follow-up chest CT.      CT-CTA HEAD WITH & W/O-POST PROCESS   Final Result      Right middle cerebral artery M1 occlusion.      Neuro IR is aware of findings.         CT-CEREBRAL PERFUSION ANALYSIS   Final Result      1.  Cerebral blood flow less than 30% likely representing completed infarct = 0 mL.      2.  T Max more than 6 seconds likely representing combination of completed infarct and ischemia = 105 mL.      3.  Mismatched volume likely representing ischemic brain/penumbra = 105 ml      4.  Please note that the cerebral perfusion was performed on the limited brain tissue around the basal ganglia region. Infarct/ischemia outside the CT perfusion sections can be missed in this study.      CT-HEAD W/O   Final Result      1.  Hyperdense right MCA with right temporal and inferior frontoparietal lobe edema with slight loss of gray-white matter differentiation consistent with infarct with subtle area of increased  attenuation at the junction of the right frontoparietal lobe    could represent some petechial hemorrhage.      2.  This was discussed with Dr. Baker at 6:55 PM who had already talked to neurology and the neurointerventionist on call.      IR-THROMBO MECHANICAL ARTERY,INIT    (Results Pending)   EC-ECHOCARDIOGRAM COMPLETE W/O CONT    (Results Pending)       I personally reviewed the images above, see imaging report     Assessment  Principal Problem:    Acute right MCA stroke (HCC)  Active Problems:    Aortic stenosis    Hypertension    Osteoporosis    Age-related physical debility      Plan   Acute right MCA stroke- Status post mechanical thrombectomy with TICI score 2A/2B  - Right M1 occlusion with right MCA bifurcation aneurysm 8 mm.   - Some improvement of left sided weakness and neuro function       Thank you for letting the Radiology team participate in the patients care. If Radiology team is required in future care please place IR order

## 2021-06-14 NOTE — PROGRESS NOTES
Unable to verify med rec with [facility] pharmacy since they haven't filled medications for pt. No other pharmacy was obtained from pt's family.

## 2021-06-14 NOTE — DIETARY
"Nutrition services: Day 2 of admit.  Sabiha Bingham is a 89 y.o. female with admitting DX of Aphasia, S/P CVA.  Consult received for BMI <19.    Assessment:  Height: 165.1 cm (5' 5\")  Weight: 47.9 kg (105 lb 9.6 oz)  Body mass index is 17.57 kg/m²., BMI classification: Underweight  Diet/Intake: NPO    Evaluation:   1. Pt is NPO x 2 days. Per SLP note 6/13, pt was not safe for PO intake. SLP to re-evaluate today.  2. Pt awake and visiting with granddaughter at time of visit. Pt and granddaughter deny unplanned weight loss. Pt has recent gained weight. She had dropped to 90+ lb range, but has since regained. They are happy with her current weight of 105 lb.  3. No significant fat or muscle loss observed beyond normal age related loss.  4. Pt is aware SLP to re-evaluate diet. She is hoping to eat.  5. No skin breakdown per chart review.  6. Labs and medications reviewed.    Malnutrition Risk: Criteria not met.    Recommendations/Plan:  1. Diet per SLP.   2. Once able to start PO diet, encourage intake of meals >50%.  3. Document intake of all meals as % taken in ADL's to provide interdisciplinary communication across all shifts.   4. Monitor weight.  5. Nutrition rep will continue to see patient for ongoing meal and snack preferences.    RD following            "

## 2021-06-15 ENCOUNTER — APPOINTMENT (OUTPATIENT)
Dept: RADIOLOGY | Facility: MEDICAL CENTER | Age: 86
DRG: 023 | End: 2021-06-15
Attending: HOSPITALIST
Payer: COMMERCIAL

## 2021-06-15 ENCOUNTER — HOSPITAL ENCOUNTER (INPATIENT)
Facility: REHABILITATION | Age: 86
End: 2021-06-15
Attending: PHYSICAL MEDICINE & REHABILITATION | Admitting: PHYSICAL MEDICINE & REHABILITATION
Payer: COMMERCIAL

## 2021-06-15 PROCEDURE — 700111 HCHG RX REV CODE 636 W/ 250 OVERRIDE (IP): Performed by: HOSPITALIST

## 2021-06-15 PROCEDURE — 770006 HCHG ROOM/CARE - MED/SURG/GYN SEMI*

## 2021-06-15 PROCEDURE — 92611 MOTION FLUOROSCOPY/SWALLOW: CPT

## 2021-06-15 PROCEDURE — 99232 SBSQ HOSP IP/OBS MODERATE 35: CPT | Performed by: PSYCHIATRY & NEUROLOGY

## 2021-06-15 PROCEDURE — 700102 HCHG RX REV CODE 250 W/ 637 OVERRIDE(OP): Performed by: HOSPITALIST

## 2021-06-15 PROCEDURE — 99233 SBSQ HOSP IP/OBS HIGH 50: CPT | Performed by: HOSPITALIST

## 2021-06-15 PROCEDURE — A9270 NON-COVERED ITEM OR SERVICE: HCPCS | Performed by: PSYCHIATRY & NEUROLOGY

## 2021-06-15 PROCEDURE — A9270 NON-COVERED ITEM OR SERVICE: HCPCS | Performed by: HOSPITALIST

## 2021-06-15 PROCEDURE — 700102 HCHG RX REV CODE 250 W/ 637 OVERRIDE(OP): Performed by: PSYCHIATRY & NEUROLOGY

## 2021-06-15 PROCEDURE — 92526 ORAL FUNCTION THERAPY: CPT

## 2021-06-15 PROCEDURE — 74230 X-RAY XM SWLNG FUNCJ C+: CPT

## 2021-06-15 RX ORDER — HEPARIN SODIUM 5000 [USP'U]/ML
5000 INJECTION, SOLUTION INTRAVENOUS; SUBCUTANEOUS EVERY 8 HOURS
Status: DISCONTINUED | OUTPATIENT
Start: 2021-06-15 | End: 2021-06-20 | Stop reason: HOSPADM

## 2021-06-15 RX ORDER — CLOPIDOGREL BISULFATE 75 MG/1
75 TABLET ORAL DAILY
Status: DISCONTINUED | OUTPATIENT
Start: 2021-06-15 | End: 2021-06-20 | Stop reason: HOSPADM

## 2021-06-15 RX ADMIN — HEPARIN SODIUM 5000 UNITS: 5000 INJECTION, SOLUTION INTRAVENOUS; SUBCUTANEOUS at 22:41

## 2021-06-15 RX ADMIN — AMLODIPINE BESYLATE 5 MG: 5 TABLET ORAL at 05:20

## 2021-06-15 RX ADMIN — HEPARIN SODIUM 5000 UNITS: 5000 INJECTION, SOLUTION INTRAVENOUS; SUBCUTANEOUS at 17:08

## 2021-06-15 RX ADMIN — CLOPIDOGREL BISULFATE 75 MG: 75 TABLET ORAL at 10:53

## 2021-06-15 ASSESSMENT — ENCOUNTER SYMPTOMS
NAUSEA: 0
DIZZINESS: 0
FEVER: 0
BLURRED VISION: 0
BACK PAIN: 0
COUGH: 0
HEADACHES: 0
SHORTNESS OF BREATH: 0
DIARRHEA: 0
FOCAL WEAKNESS: 1
PALPITATIONS: 0
ABDOMINAL PAIN: 0
DOUBLE VISION: 0
SORE THROAT: 0
CHILLS: 0
VOMITING: 0
LOSS OF CONSCIOUSNESS: 0

## 2021-06-15 ASSESSMENT — PATIENT HEALTH QUESTIONNAIRE - PHQ9
SUM OF ALL RESPONSES TO PHQ9 QUESTIONS 1 AND 2: 0
1. LITTLE INTEREST OR PLEASURE IN DOING THINGS: NOT AT ALL
2. FEELING DOWN, DEPRESSED, IRRITABLE, OR HOPELESS: NOT AT ALL

## 2021-06-15 ASSESSMENT — FIBROSIS 4 INDEX: FIB4 SCORE: 4.38

## 2021-06-15 NOTE — PROGRESS NOTES
"Neurology Progress Note  Neurohospitalist Service, [facility]    Referring Physician: Jonas Coppola D.O.      Interval History: No acute events, ARU placement recommended    Review of systems: In addition to what is detailed in the HPI and/or updated in the interval history, all other systems reviewed and are negative.    Past Medical History, Past Surgical History and Social History reviewed and unchanged from prior    Medications:    Current Facility-Administered Medications:   •  amLODIPine (NORVASC) tablet 5 mg, 5 mg, Oral, Q DAY, Delmar Garibay M.D., 5 mg at 06/15/21 0520  •  hydrALAZINE (APRESOLINE) injection 10 mg, 10 mg, Intravenous, Q2HRS PRN, Tong Schrader M.D.  •  labetalol (NORMODYNE/TRANDATE) injection 10 mg, 10 mg, Intravenous, Q10 MIN PRN, Tong Schrader M.D.  •  atorvastatin (LIPITOR) tablet 40 mg, 40 mg, Oral, Q EVENING, Cullen Yang M.D., 40 mg at 06/14/21 1707  •  [Held by provider] aspirin (ASA) chewable tab 81 mg, 81 mg, Oral, DAILY, Cullen Yang M.D.    Physical Examination:   /59   Pulse (!) 55   Temp 36.5 °C (97.7 °F) (Temporal)   Resp (!) 34   Ht 1.651 m (5' 5\")   Wt 47.2 kg (104 lb 0.9 oz)   SpO2 94%   BMI 17.32 kg/m²       General: Awake sitting in chair  Neck: There is normal range of motion  CV: Regular rate   Extremities:  Warm, dry, and intact, without peripheral lower extremity edema    NEUROLOGICAL EXAM:     Mental status: Sitting in chair, alert and fully oriented, follows commands  Speech and language: Speech is clear and fluent. The patient is able to name and repeat.  Cranial nerve exam: Visual fields are full. There is no nystagmus. Extraocular muscles are intact, but there is a R gaze preference. Face appears symmetric. Tongue is midline.  Motor exam: RUE/RLE are sustained antigravity.  LUE is antigravity with drift to bed- requires constant encouragement to get antigravity.  LLE with antigravity strength with slight " drift.  Sensory exam:  Reacts to tactile in all 4 extremities, there is neglect to double stim on L  Coordination: No ataxia on RUE FTN testing      NIHSS: National Institutes of Health Stroke Scale    [1] 1a:Level of Consciousness    0-alert 1-drowsy   2-stupor   3-coma  [0] 1b:LOC Questions                  0-both  1-one      2-neither  [0] 1c:LOC Commands                   0-both  1-one      2-neither  [1] 2: Best Gaze                     0-nl    1-partial  2-forced  [0] 3: Visual Fields                   0-nl    1-partial  2-complete 3-bilat  [0] 4: Facial Paresis                0-nl    1-minor    2-partial  3-full  MOTOR                       0-nl  [0] 5: Right Arm           1-drift  [2] 6: Left Arm             2-some effort vs gravity  [0] 7: Right Leg           3-no effort vs gravity  [1] 8: Left Leg             4-no movement                             x-untestable  [0] 9: Limb Ataxia                    0-abs   1-1_limb   2-2+_limbs       x-untestable  [0] 10:Sensory                        0-nl    1-partial  2-dense  [0] 11:Best Language/Aphasia         0-nl    1-mild/mod 2-severe   3-mute  [0] 12:Dysarthria                     0-nl    1-mild/mod 2-severe       x-untestable  [1] 13:Neglect/Inattention            0-none  1-partial  2-complete  [6] TOTAL      Objective Data:    Labs:  Lab Results   Component Value Date/Time    PROTHROMBTM 15.7 (H) 06/12/2021 06:31 PM    INR 1.28 (H) 06/12/2021 06:31 PM      Lab Results   Component Value Date/Time    WBC 10.3 06/14/2021 03:30 AM    RBC 3.16 (L) 06/14/2021 03:30 AM    HEMOGLOBIN 9.6 (L) 06/14/2021 03:30 AM    HEMATOCRIT 29.1 (L) 06/14/2021 03:30 AM    MCV 92.1 06/14/2021 03:30 AM    MCH 30.4 06/14/2021 03:30 AM    MCHC 33.0 (L) 06/14/2021 03:30 AM    MPV 11.3 06/14/2021 03:30 AM    NEUTSPOLYS 79.30 (H) 06/14/2021 03:30 AM    LYMPHOCYTES 9.20 (L) 06/14/2021 03:30 AM    MONOCYTES 10.10 06/14/2021 03:30 AM    EOSINOPHILS 0.40 06/14/2021 03:30 AM    BASOPHILS 0.50  06/14/2021 03:30 AM      Lab Results   Component Value Date/Time    SODIUM 136 06/14/2021 03:30 AM    POTASSIUM 3.8 06/14/2021 03:30 AM    CHLORIDE 104 06/14/2021 03:30 AM    CO2 22 06/14/2021 03:30 AM    GLUCOSE 94 06/14/2021 03:30 AM    BUN 22 06/14/2021 03:30 AM    CREATININE 0.78 06/14/2021 03:30 AM      Lab Results   Component Value Date/Time    CHOLSTRLTOT 127 06/13/2021 01:35 AM    LDL 69 06/13/2021 01:35 AM    HDL 48 06/13/2021 01:35 AM    TRIGLYCERIDE 49 06/13/2021 01:35 AM       Lab Results   Component Value Date/Time    ALKPHOSPHAT 61 06/12/2021 06:31 PM    ASTSGOT 25 06/12/2021 06:31 PM    ALTSGPT 14 06/12/2021 06:31 PM    TBILIRUBIN 0.8 06/12/2021 06:31 PM        Imaging/Testing:    I interpreted and/or reviewed the patient's neuroimaging    MR-BRAIN-W/O   Final Result      1.  Moderate cerebral atrophy expected for the patient's age.   2.  Moderately extensive areas of acute infarction in the right MCA territory as described above. Small foci of hemorrhagic transformation at the right posterior frontal-parietal region. The most prominent focus measuring about 9 mm would appear to    correspond with the small focus of hemorrhage seen on the CT prior to the thrombectomy procedure.   3.  Moderate supratentorial white matter disease most consistent with microvascular ischemic change.   4.  Old lacunar infarct right cerebellar hemisphere superiorly.   5.  Subcentimeter lacunar size acute infarct at the inferior aspect of the right cerebellar hemisphere.   6.  Variant anatomy with a diminutive vertebrobasilar system and apparent carotid origin of both posterior cerebral arteries.   7.  6 mm aneurysm at the right MCA genu concordant with catheter angiography.      DX-CHEST-PORTABLE (1 VIEW)   Final Result      Cardiomegaly. No focal consolidation or pleural effusions.      IR-THROMBO MECHANICAL ARTERY,INIT   Final Result         89-year-old patient who presented with acute onset of left arm and leg weakness  was found to have a right M1 MCA occlusion on CT angiography with a favorable perfusion profile underwent emergent right middle cerebral artery mechanical thrombectomy as    described above. Initial angiogram demonstrated complete occlusion of the right M1 segment.      The final angiogram showed partial recanalization of the right MCA with improved flow to the superior and inferior division branches. Final recanalization score: TICI 2A      Final angiogram also demonstrated an aneurysm measuring 7 x 8 mm involving the right M1 bifurcation, which was likely the source of the thrombus.         CT-CTA NECK WITH & W/O-POST PROCESSING   Final Result      1.  Mild calcified plaque in the left carotid bulb and proximal left internal carotid artery with less than 50% stenosis.      2.  Soft tissue density in the mediastinum and hilum could represent lymphadenopathy which is incompletely visualized/evaluated on this neck CTA. Recommend follow-up chest CT.      CT-CTA HEAD WITH & W/O-POST PROCESS   Final Result      Right middle cerebral artery M1 occlusion.      Neuro IR is aware of findings.         CT-CEREBRAL PERFUSION ANALYSIS   Final Result      1.  Cerebral blood flow less than 30% likely representing completed infarct = 0 mL.      2.  T Max more than 6 seconds likely representing combination of completed infarct and ischemia = 105 mL.      3.  Mismatched volume likely representing ischemic brain/penumbra = 105 ml      4.  Please note that the cerebral perfusion was performed on the limited brain tissue around the basal ganglia region. Infarct/ischemia outside the CT perfusion sections can be missed in this study.      CT-HEAD W/O   Final Result      1.  Hyperdense right MCA with right temporal and inferior frontoparietal lobe edema with slight loss of gray-white matter differentiation consistent with infarct with subtle area of increased attenuation at the junction of the right frontoparietal lobe    could represent  some petechial hemorrhage.      2.  This was discussed with Dr. Baker at 6:55 PM who had already talked to neurology and the neurointerventionist on call.      EC-ECHOCARDIOGRAM COMPLETE W/O CONT    (Results Pending)       Assessment and Plan:  Ángel Jordan is a 89 year old woman presenting with L side weakness, slurred speech, found to have R M1 occlusion, not a candidate for tPA as outside time window, but did undergo endovascular clot retrieval with TICI 2B reperfusion on 6/12.  Post-op BP goal 160-180 given history of aortic stenosis, and to mitigate reperfusion injury.  Improved exam post-operatively. MRI with modest stroke burden, with a small foci of hemorrhagic conversion. She has restarted on plavix therapy given breakthrough event on ASA.     Will continue with liberal BP goal given her aortic stenosis.  Deferring TTE, and long-term cardiac monitoring, as not an ideal candidate for anticoagulation given relatively advance age, large untreated aneurysm.    Problem list:  1.  R MCA stroke s/p endovascular clot retrieval of R M1 with TICI 2B reperfusion  2.  Hypertension  3.  Aortic stenosis  4.  R MCA 8mm saccular aneurysm    Plan:   - q4h neurochecks ok   - continue plavix 75 mg daily   - stroke labs:  HgbA1c and LDL 69   - may defer statin therapy given advance age and LDL at goal < 70   - ok with long-term, liberal BP goal given advance age and history of aortic stenosis- SBP goal 120-180 may be appropriate   - PT/OT/SLP    - for 8mm R MCA aneurysm- no treatment or surveillance indicated given advance age   - ok to start lovenox SQ for DVT ppx   - please voalte Neurology on call with any further questions or concerns    The evaluation of the patient, and recommended management, was discussed with the ICU team. I have performed a physical exam and reviewed and updated ROS and Plan today (6/15/2021).     Tong Cai MD  Neurohospitalist, Acute Care Services

## 2021-06-15 NOTE — THERAPY
Speech Language Pathology   Video Swallow Evaluation   (Modified Barium Swallow)  Patient Name: Sabiha Bingham  AGE:  89 y.o., SEX:  female  Medical Record #: [id number]  Today's Date: 6/15/2021     Precautions  Precautions: (P) Fall Risk, Swallow Precautions ( See Comments)  Comments: (P) L neglect; intermittent confusion    Assessment    Patient is a 89 y.o. female admitted after being found down. Imaging revealed hyperdense R MCA with R temporal and inferior frontoparietal lobe edema with slight loss of gray-white differentiation consistent infarct with subtle area of increased attenuation at the junction of the R frontoparietal lobe that could represent some petechial hemorrhage. CT angio head with the R MCA M1 occlusion. Patient has a history of HTN and cerebral aneurysm. CxR 6/13: no focal consolidation or pleural effusions.     Discussed with the patient the risks, benefits, and alternatives of the MBSS procedure. Patient acknowledges and agreeable to proceed with the procedure. Pt presents with a moderate-severe oropharyngeal dysphagia marked by impaired bolus control, premature spillage of thins and thick liquids to the laryngeal vestibule before the onset of the swallow with silent aspiration during the swallow for both textures, delayed laryngeal closure and weak cued cough that was minimally effective to expel contrast from the airway. Reduced tongue base strength/retraction resulted in mild vallecular w/ liquids and applesauce, moderate vallecular residue w/ purees and severe vallecular residue with soft/bite sized solids. Reducing sip size and use of a 3-sec prep was effective to reduce premature spillage of thin and thick liquids and eliminate aspiration. Swallow prognosis is fair-good considering pt is able to follow directions for strategies/exercises and is in the acute phase of her stroke with spontaneous recovery possible. Recommend continuing current diet of Liquidised solids (LQ3) and Mildly Thick  "Liquids (MT2) with direct meal supervision and the following strategies: small cup sips, use a teaspoon instead of a soup spoon w/ meals, 3-second prep (\"1-2-3-swallow\"), two swallows per bite/sips to reduce pharyngeal residue, cough and reswallow with any increased wet/gurgly vocal quality. Oral care after meals and mobilize as deemed appropriate w/ therapy and nursing to mitigate the risk of aspiration pneumonia.       Plan    Liquidised solids (LQ3) and Mildly Thick Liquids (MT2) with direct meal supervision and the following strategies: small cup sips, use a teaspoon instead of a soup spoon w/ meals, 3-second prep (\"1-2-3-swallow\"), two swallows per bite/sips to reduce pharyngeal residue, cough and reswallow with any increased wet/gurgly vocal quality.   Crush pills in applesauce.  Oral care after meals and mobilize as deemed appropriate w/ therapy and nursing to mitigate the risk of aspiration pneumonia.     Recommend Speech Therapy 5 times per week until therapy goals are met for the following treatments:  Dysphagia Training and Patient / Family / Caregiver Education.    Discharge Recommendations: (P) Recommend post-acute placement for additional speech therapy services prior to discharge home       Objective       06/15/21 1515   History / Background Information   Prior Level of Function for Eating / Swallowing WFL   Diagnosis R MCA CVA   Onset Date Of Dysphagia 6/13/21   Dysphagia Symptoms Warranting Video Swallow s/sx of aspiration at the bedside   General Anatomy / Physiology typical   Procedure   Patient Seated in  MBS Chair   Seated at (Degrees) 90   Views Completed Lateral   Consistencies / Presentation Method   Mildly Thick (2) - (Nectar Thick) Teaspoon;Cup   Thin (0) Teaspoon;Cup   Liquidised (3) Teaspoon   Pureed (4) Teaspoon   Soft & Bite-Sized (6) - (Dysphagia III) Teaspoon   Oral Phase   Mildly Thick (2) - (Nectar Thick) Oral Residue After the Swallow;Premature Spillage Into Lateral Channels "   Thin (0) Oral Residue After the Swallow;Other (See Comments)  (Prematur spillage into laryngeal vestibule)   Liquidised (3) Oral Residue After the Swallow;Premature Spillage Into Valleculae   Pureed (4) Delayed Oral Transit;Oral Residue After the Swallow;Other (See Comments)  (premature spillage into laryngeal vestibule)   Soft & Bite-Sized (6) - (Dysphagia III) Oral Residue After the Swallow   Pharyngeal Phase   Mildly Thick (2) - (Nectar Thick) Delayed Onset of Swallow;Reduced Tongue Base Retraction;Residue In Valleculae;Reduced Hyo-Laryngeal Elevation;Penetration Before Swallow;Penetration During Swallow;Aspiration During Swallow;Absent Cough Response to Penetration / Aspiration    Thin (0) Delayed Onset of Swallow;Reduced Tongue Base Retraction;Residue In Valleculae;Reduced Hyo-Laryngeal Elevation;Penetration Before Swallow;Penetration During Swallow;Aspiration During Swallow;Aspiration After Swallow;Absent Cough Response to Penetration / Aspiration   Liquidised (3) Delayed Onset of Swallow;Reduced Tongue Base Retraction;Residue In Valleculae;Penetration During Swallow   Pureed (4) Delayed Onset of Swallow;Reduced Tongue Base Retraction ;Residue In Valleculae;Reduced Hyo-Laryngeal Elevation;Penetration During Swallow   Soft & Bite-Sized (6) - (Dysphagia III) Delayed Onset of Swallow;Reduced Tongue Base Retraction;Residue In Valleculae;Reduced Hyo-Laryngeal Elevation;Penetration During Swallow   Esophageal Phase   Esophageal Phase WDL   Esophageal Phase Comments esophagus not scanned; UES was patent for all consistencies   Compensatory Strategies Attempted   Multiple Swallows effective to minimize vallecular residue   Effortful Swallows no change in vallecular residue with any textures   Controlled Bolus Size effective to eliminate aspiration with thin and thick liquids   Cough / Clear After Swallow partially effective to clear the laryngeal vestibule, but not to eject contrast from the trachea   Liquid Wash  "After Swallow not effective; resulted in spillage to the laryngeal vestibule and silent aspiration of thin and thick liquids   Three Second Prep effective w/ thin and thick liquids in isolation to reduce penetration   Penetration Aspiration Scale   Penetration Aspiration Scale 8 - Material passes glottis and is not ejected, visible subglottic stasis, absent patient response   Impression   Pharyngeal Moderate Impairment   Prognosis   Prognosis for Improvement Good   Barriers to Improvement age; ?cognitive deficits   Positive Indicators for Improvement good family support; acute CVA w/ spontaneous recovery possible   Recommendations   Diet / Liquid Recommendation Liquidised (3) - (Nectar Thick Full Liquid);Mildly Thick (2) - (Nectar Thick)   Medication Administration  Crush all Medications in Puree  (in applesauce, not pudding)   Strategies / Precautions Supervision Required;Small Bites;Small Sips;No Straws;Multiple Swallows;Sitting Upright at 90 Degrees while Eating   Interventions Dysphagia Therapy by SLP;Compensatory Safe Swallow Strategy Training;1 : 1 Supervision / Assistance with Nursing;Oral Strengthening Exercises;Pharyngeal - Laryngeal Strengthening Exercises;Patient / Caregiver Education / Training;Follow Up Video Swallow   Patient / Family Goals   Patient / Family Goal #1 6/14: \"I'm hungry and I'm thirsty.\"   Goal #1 Outcome Progressing as expected   Short Term Goals   Short Term Goal # 1 B  Patient will consume meals of liquidized solids/mildly thick liquids with no s/sx of aspiration given direct meal supervision and feeding A as needed.    Goal Outcome  # 1 B Progressing as expected   Short Term Goal # 2 Patient will complete swallowing exercises targeting L facial, lingual, BoT and pharyngeal weakness x15 reps per tx with mod cues.  (goal not targeted during MBS)   Short Term Goal # 3 New 6/15: Patient will participate in a MBS to assess swallow function and direct POC.   Goal Outcome  # 3 Goal met "

## 2021-06-15 NOTE — DISCHARGE PLANNING
"Anticipated Discharge Disposition: Acute rehab-[facility]    Action: CM spoke with Fang @ [facility]; referral currently under review. Fang reports she will make contact with pt and granddaughter to discuss acute rehab/post discharge plan.  Fang also reports she is contacting pt's insurance to review benefits.     EVERARDO spoke with pt's granddaughter April at bedside and verified post rehab discharge plan.  Per April, she has sold her home and intends on moving in with patient after she is discharged. April reports pt will have \"plenty of support\" at home. CM updated April on rehab referral status; April verbalized understanding.     Barriers to Discharge: Acute rehab acceptance pending    Plan: Care coordination will follow and assist with discharge to acute rehab.     "

## 2021-06-15 NOTE — CARE PLAN
Problem: Optimal Care of the Stroke Patient  Goal: Optimal emergency care for the stroke patient  Outcome: Progressing  Goal: Optimal acute care for the stroke patient  Outcome: Progressing     Problem: Psychosocial - Patient Condition  Goal: Patient's ability to verbalize feelings about condition will improve  Outcome: Progressing   The patient is Stable - Low risk of patient condition declining or worsening    Shift Goals  Clinical Goals: Increased mobility on L side, -140  Patient Goals: Get better  Family Goals: Improve    Progress made toward(s) clinical / shift goals:  Mobilized with PT/OT today. Pending rehab placement    Patient is not progressing towards the following goals:

## 2021-06-15 NOTE — DISCHARGE PLANNING
Agency/Facility Name: [facility]  Spoke To: Angeline  Outcome: Will have clinical review.

## 2021-06-15 NOTE — THERAPY
"Speech Language Pathology  Daily Treatment     Patient Name: Sabiha Bingham  Age:  89 y.o., Sex:  female  Medical Record #: [id number]  Today's Date: 6/15/2021     Precautions  Precautions: Fall Risk, Swallow Precautions ( See Comments)  Comments: L neglect/R gaze preference    Assessment    Patient seen on this date for dysphagia tx with lunch meal of LQ3/MT2. Pt was A&Ox3, stated that the year was “01.” Pt noted to have some confusion, perseverating on being frustrated that she wasn't able to go out to breakfast this morning and confusing the therapist for her granddaughter. Min verbal cues provided to pt to check for L oral pocketing. Immediate reflexive coughing occurred x2 following intake of liquidized foods, concerning for penetration/aspiration. Increasing wet/gurgly vocal quality noted with PO intake. Cued throat clear was effective for vocal quality to return to baseline. Soup spoon was swapped for a teaspoon to reduce bolus size which aided in reducing s/sx of aspiration. Given concern for R MCA stroke and s/sx of aspiration on modified diet, recommend an MBS to objectively assess swallow function and further direct POC. Pt education provided with pt agreeable to an MBS stating \"I don't want to deal with a pneumonia, I'll do whatever you think is best.\" SLP will plan to complete MBS this afternoon.    Plan    Continue LQ3/MT2 diet by tsp pending MBS results  Crush meds in apple sauce  MBS today    Continue current treatment plan.    Discharge Recommendations: Recommend post-acute placement for additional speech therapy services prior to discharge home       Objective       06/15/21 1237   Cognitive-Linguistic   Level of Consciousness Alert   Orientation Level Not Oriented to Year   Dysphagia    Dysphagia X   Positioning / Behavior Modification Modulate Rate or Bite Size;Self Monitoring;Multiple Swallows;Other (see Comments)  (check for L pocketing and clear)   Oral / Pharyngeal / Laryngeal Exercises Pharyngeal " "Constriction Exercises   Other Treatments meal tx LQ3/MT2   Diet / Liquid Recommendation Liquidised (3) - (Nectar Thick Full Liquid);Mildly Thick (2) - (Nectar Thick)  (by tsp; pending MBS results)   Nutritional Liquid Intake Rating Scale Thickened beverages (mildly thick unless otherwise specified)   Nutritional Food Intake Rating Scale Total oral diet of a single consistency   Nursing Communication Swallow Precaution Sign Posted at Head of Bed   Skilled Intervention Verbal Cueing;Compensatory Strategies;Tactile Cueing   Recommended Route of Medication Administration   Medication Administration  Crush all Medications in Puree   Patient / Family Goals   Patient / Family Goal #1 6/14: \"I'm hungry and I'm thirsty.\"   Goal #1 Outcome Progressing slower than expected   Short Term Goals   Short Term Goal # 1 B  Patient will consume meals of liquidized solids/mildly thick liquids with no s/sx of aspiration given direct meal supervision and feeding A as needed.    Goal Outcome  # 1 B Progressing slower than expected   Short Term Goal # 3 New 6/15: Patient will participate in a MBS to assess swallow function and direct POC.         "

## 2021-06-16 PROCEDURE — 700102 HCHG RX REV CODE 250 W/ 637 OVERRIDE(OP): Performed by: PSYCHIATRY & NEUROLOGY

## 2021-06-16 PROCEDURE — 97112 NEUROMUSCULAR REEDUCATION: CPT | Mod: CO

## 2021-06-16 PROCEDURE — 700102 HCHG RX REV CODE 250 W/ 637 OVERRIDE(OP): Performed by: HOSPITALIST

## 2021-06-16 PROCEDURE — 700111 HCHG RX REV CODE 636 W/ 250 OVERRIDE (IP): Performed by: HOSPITALIST

## 2021-06-16 PROCEDURE — 770006 HCHG ROOM/CARE - MED/SURG/GYN SEMI*

## 2021-06-16 PROCEDURE — A9270 NON-COVERED ITEM OR SERVICE: HCPCS | Performed by: HOSPITALIST

## 2021-06-16 PROCEDURE — 700102 HCHG RX REV CODE 250 W/ 637 OVERRIDE(OP): Performed by: GENERAL PRACTICE

## 2021-06-16 PROCEDURE — 99232 SBSQ HOSP IP/OBS MODERATE 35: CPT | Performed by: GENERAL PRACTICE

## 2021-06-16 PROCEDURE — 97535 SELF CARE MNGMENT TRAINING: CPT | Mod: CO

## 2021-06-16 PROCEDURE — 99232 SBSQ HOSP IP/OBS MODERATE 35: CPT | Performed by: PSYCHIATRY & NEUROLOGY

## 2021-06-16 PROCEDURE — A9270 NON-COVERED ITEM OR SERVICE: HCPCS | Performed by: GENERAL PRACTICE

## 2021-06-16 PROCEDURE — 92526 ORAL FUNCTION THERAPY: CPT

## 2021-06-16 PROCEDURE — A9270 NON-COVERED ITEM OR SERVICE: HCPCS | Performed by: PSYCHIATRY & NEUROLOGY

## 2021-06-16 RX ORDER — AMOXICILLIN 250 MG
2 CAPSULE ORAL 2 TIMES DAILY
Status: DISCONTINUED | OUTPATIENT
Start: 2021-06-16 | End: 2021-06-20 | Stop reason: HOSPADM

## 2021-06-16 RX ORDER — ACETAMINOPHEN 325 MG/1
650 TABLET ORAL EVERY 4 HOURS PRN
Status: DISCONTINUED | OUTPATIENT
Start: 2021-06-16 | End: 2021-06-20 | Stop reason: HOSPADM

## 2021-06-16 RX ORDER — BISACODYL 10 MG
10 SUPPOSITORY, RECTAL RECTAL
Status: DISCONTINUED | OUTPATIENT
Start: 2021-06-16 | End: 2021-06-20 | Stop reason: HOSPADM

## 2021-06-16 RX ORDER — BISACODYL 5 MG
5 TABLET, DELAYED RELEASE (ENTERIC COATED) ORAL ONCE
Status: COMPLETED | OUTPATIENT
Start: 2021-06-16 | End: 2021-06-16

## 2021-06-16 RX ORDER — POLYETHYLENE GLYCOL 3350 17 G/17G
1 POWDER, FOR SOLUTION ORAL
Status: DISCONTINUED | OUTPATIENT
Start: 2021-06-16 | End: 2021-06-20 | Stop reason: HOSPADM

## 2021-06-16 RX ADMIN — BISACODYL 5 MG: 5 TABLET, COATED ORAL at 13:04

## 2021-06-16 RX ADMIN — ACETAMINOPHEN 650 MG: 325 TABLET, FILM COATED ORAL at 22:05

## 2021-06-16 RX ADMIN — AMLODIPINE BESYLATE 5 MG: 5 TABLET ORAL at 04:26

## 2021-06-16 RX ADMIN — DOCUSATE SODIUM 50 MG AND SENNOSIDES 8.6 MG 2 TABLET: 8.6; 5 TABLET, FILM COATED ORAL at 13:03

## 2021-06-16 RX ADMIN — ACETAMINOPHEN 650 MG: 325 TABLET, FILM COATED ORAL at 10:53

## 2021-06-16 RX ADMIN — HEPARIN SODIUM 5000 UNITS: 5000 INJECTION, SOLUTION INTRAVENOUS; SUBCUTANEOUS at 13:03

## 2021-06-16 RX ADMIN — HEPARIN SODIUM 5000 UNITS: 5000 INJECTION, SOLUTION INTRAVENOUS; SUBCUTANEOUS at 22:05

## 2021-06-16 RX ADMIN — HEPARIN SODIUM 5000 UNITS: 5000 INJECTION, SOLUTION INTRAVENOUS; SUBCUTANEOUS at 04:26

## 2021-06-16 RX ADMIN — CLOPIDOGREL BISULFATE 75 MG: 75 TABLET ORAL at 04:26

## 2021-06-16 RX ADMIN — DOCUSATE SODIUM 50 MG AND SENNOSIDES 8.6 MG 2 TABLET: 8.6; 5 TABLET, FILM COATED ORAL at 17:05

## 2021-06-16 ASSESSMENT — COGNITIVE AND FUNCTIONAL STATUS - GENERAL
PERSONAL GROOMING: A LITTLE
HELP NEEDED FOR BATHING: A LOT
EATING MEALS: A LITTLE
DRESSING REGULAR LOWER BODY CLOTHING: A LOT
TOILETING: A LOT
SUGGESTED CMS G CODE MODIFIER DAILY ACTIVITY: CK
DAILY ACTIVITIY SCORE: 14
DRESSING REGULAR UPPER BODY CLOTHING: A LOT

## 2021-06-16 ASSESSMENT — PAIN DESCRIPTION - PAIN TYPE
TYPE: ACUTE PAIN

## 2021-06-16 ASSESSMENT — ENCOUNTER SYMPTOMS: WEAKNESS: 1
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
## 2021-06-16 NOTE — HOSPITAL COURSE
89 year old woman with PMHx hypertension, osteoporosis, hx of cerebral aneurysm, and AS, presenting with L side weakness, slurred speech, found to have right MCA CVA with aneurysm not a candidate for tPA as outside time window, but did undergo endovascular clot retrieval on 6/12.    She has restarted on plavix therapy given breakthrough event on ASA.    Patient evaluated by SLP, modified barium swallow was performed, patient started on a modified diet with supervision.    PT/OT recommended SNF.

## 2021-06-16 NOTE — PROGRESS NOTES
4 Eyes Skin Assessment Completed by Cadence KWON RN and Ann Marie FROST RN.    Head WDL  Ears WDL  Nose WDL  Mouth WDL  Neck WDL  Breast/Chest WDL  Shoulder Blades WDL  Spine WDL  (R) Arm/Elbow/Hand Bruising and Abrasion  (L) Arm/Elbow/Hand Bruising and Abrasion  Abdomen WDL  Groin Redness  Scrotum/Coccyx/Buttocks Redness and Blanching  (R) Leg Bruising  (L) Leg Bruising and Abrasion  (R) Heel/Foot/Toe WDL  (L) Heel/Foot/Toe WDL          Devices In Places SCD's      Interventions In Place Waffle Overlay, Pillows and Pressure Redistribution Mattress    Possible Skin Injury No    Pictures Uploaded Into Epic N/A  Wound Consult Placed N/A  RN Wound Prevention Protocol Ordered Yes

## 2021-06-16 NOTE — CARE PLAN
Problem: Nutritional:  Goal: Achieve adequate nutritional intake  Description: Advance diet as tolerated. Patient will consume >50% of meals.  Outcome: Progressing     Diet advanced on 6/14 to Thick Liquidized. Per flowsheets, PO variable <% of meals, most meals 25-50%. PO intake appears to be trending up. Note documented Boost supplement 50-75%- Pt without supplement order. Elderly Pt to benefit from High protein supplement w/meals. Will add supplement order and Boost Thickened TID.  RD following.

## 2021-06-16 NOTE — DISCHARGE PLANNING
Agency/Facility Name: [facility]  Spoke To: Angeline  Outcome: Waiting for insurance auth.

## 2021-06-16 NOTE — PROGRESS NOTES
Patient transferred by wheelchair from R-112 to S-196-2 with belongings and chart (no medications). Message left for granddaughter to notify of transfer.

## 2021-06-16 NOTE — THERAPY
"Speech Language Pathology  Daily Treatment     Patient Name: Sabiha Bingham  Age:  89 y.o., Sex:  female  Medical Record #: [id number]  Today's Date: 6/16/2021     Precautions  Precautions: (P) Fall Risk, Swallow Precautions ( See Comments)  Comments: (P) L neglect    Assessment    Pt seen this date for dysphagia intervention. Per RN, pt coughing with breakfast tray. Pt unable to recall swallow strategies. Purpose for swallow strategies explained to pt who verbalized good understanding. PO trials of MTL and liquidized by tsp assessed. Hyolaryngeal elevation palpated as complete and timely initiation of swallow appreciated. Pt required mod cueing to implement 3 second prep at start of session, reducing to min cueing by end of session. In instances where 3 second prep was not utilized or improperly implemented pt had immediate cough, which is concerning for penetration/aspiration. Pt required min cues for double swallow with liquidized trials.     Recommend continuation of liquidized/mildly thick liquid diet with adherence to the following: direct supervision and feeding assistance as needed, liquids via tsp only (no soup spoons), 3-second prep with liquids, double swallow, cough and reswallow with any increased wet/gurgly vocal quality, no straws. Please crush meds in applesauce. SLP following.     Plan    Continue current treatment plan.    Discharge Recommendations: (P) Recommend post-acute placement for additional speech therapy services prior to discharge home    Subjective    Pt alert, oriented to year, followed directions and participated fully stating \"I want to do everything right to get the flock out of here\".     Objective       06/16/21 1522   Dysphagia    Dysphagia X   Positioning / Behavior Modification 3 Second Prep;Modulate Rate or Bite Size;Multiple Swallows;Self Monitoring   Other Treatments PO trials LQ3, MT2 with strategies   Diet / Liquid Recommendation Liquidised (3) - (Nectar Thick Full Liquid);Mildly " "Thick (2) - (Nectar Thick)   Nutritional Liquid Intake Rating Scale Thickened beverages (mildly thick unless otherwise specified)   Nutritional Food Intake Rating Scale Total oral diet of a single consistency   Nursing Communication Swallow Precaution Sign Posted at Head of Bed   Skilled Intervention Compensatory Strategies;Verbal Cueing   Recommended Route of Medication Administration   Medication Administration  Other (See Comments)  (crush meds in applesauce)   Patient / Family Goals   Patient / Family Goal #1 6/14: \"I'm hungry and I'm thirsty.\"   Goal #1 Outcome Progressing as expected   Short Term Goals   Short Term Goal # 1 B  Patient will consume meals of liquidized solids/mildly thick liquids with no s/sx of aspiration given direct meal supervision and feeding A as needed.    Goal Outcome  # 1 B Progressing as expected         "

## 2021-06-16 NOTE — PROGRESS NOTES
"Neurology Progress Note  Neurohospitalist Service, [facility]    Referring Physician: Jonas Coppola D.O.      Interval History: No acute events, transferred to floor    Review of systems: In addition to what is detailed in the HPI and/or updated in the interval history, all other systems reviewed and are negative.    Past Medical History, Past Surgical History and Social History reviewed and unchanged from prior    Medications:    Current Facility-Administered Medications:   •  clopidogrel (PLAVIX) tablet 75 mg, 75 mg, Oral, DAILY, Tong Cai M.D., 75 mg at 06/16/21 0426  •  heparin injection 5,000 Units, 5,000 Units, Subcutaneous, Q8HRS, Dung Huitron D.O., 5,000 Units at 06/16/21 0426  •  amLODIPine (NORVASC) tablet 5 mg, 5 mg, Oral, Q DAY, Delmar Garibay M.D., 5 mg at 06/16/21 0426  •  hydrALAZINE (APRESOLINE) injection 10 mg, 10 mg, Intravenous, Q2HRS PRN, Tong Schrader M.D.  •  labetalol (NORMODYNE/TRANDATE) injection 10 mg, 10 mg, Intravenous, Q10 MIN PRN, Tong Schrader M.D.    Physical Examination:   /74   Pulse 60   Temp 36.4 °C (97.5 °F) (Temporal)   Resp 17   Ht 1.651 m (5' 5\")   Wt 51.4 kg (113 lb 5.1 oz)   SpO2 90%   BMI 18.86 kg/m²       General: Sleepy but arousable  Neck: There is normal range of motion  CV: Regular rate   Extremities:  Warm, dry, and intact, without peripheral lower extremity edema    NEUROLOGICAL EXAM:     Mental status: Sleepy but arousable, fully oriented, follows commands  Speech and language: Speech is clear and fluent. The patient is able to name and repeat.  Cranial nerve exam: Visual fields are full. There is no nystagmus. Extraocular muscles are intact, but there is a R gaze preference. Face appears symmetric. Tongue is midline.  Motor exam: RUE/RLE are sustained antigravity.  LUE is antigravity with drift to bed- requires constant encouragement to get antigravity.  LLE with antigravity strength with slight " drift.  Sensory exam:  Reacts to tactile in all 4 extremities, there is neglect to double stim on L  Coordination: No ataxia on RUE FTN testing      NIHSS: National Institutes of Health Stroke Scale    [1] 1a:Level of Consciousness    0-alert 1-drowsy   2-stupor   3-coma  [0] 1b:LOC Questions                  0-both  1-one      2-neither  [0] 1c:LOC Commands                   0-both  1-one      2-neither  [1] 2: Best Gaze                     0-nl    1-partial  2-forced  [0] 3: Visual Fields                   0-nl    1-partial  2-complete 3-bilat  [0] 4: Facial Paresis                0-nl    1-minor    2-partial  3-full  MOTOR                       0-nl  [0] 5: Right Arm           1-drift  [2] 6: Left Arm             2-some effort vs gravity  [0] 7: Right Leg           3-no effort vs gravity  [1] 8: Left Leg             4-no movement                             x-untestable  [0] 9: Limb Ataxia                    0-abs   1-1_limb   2-2+_limbs       x-untestable  [0] 10:Sensory                        0-nl    1-partial  2-dense  [0] 11:Best Language/Aphasia         0-nl    1-mild/mod 2-severe   3-mute  [0] 12:Dysarthria                     0-nl    1-mild/mod 2-severe       x-untestable  [1] 13:Neglect/Inattention            0-none  1-partial  2-complete  [6] TOTAL      Objective Data:    Labs:  Lab Results   Component Value Date/Time    PROTHROMBTM 15.7 (H) 06/12/2021 06:31 PM    INR 1.28 (H) 06/12/2021 06:31 PM      Lab Results   Component Value Date/Time    WBC 10.3 06/14/2021 03:30 AM    RBC 3.16 (L) 06/14/2021 03:30 AM    HEMOGLOBIN 9.6 (L) 06/14/2021 03:30 AM    HEMATOCRIT 29.1 (L) 06/14/2021 03:30 AM    MCV 92.1 06/14/2021 03:30 AM    MCH 30.4 06/14/2021 03:30 AM    MCHC 33.0 (L) 06/14/2021 03:30 AM    MPV 11.3 06/14/2021 03:30 AM    NEUTSPOLYS 79.30 (H) 06/14/2021 03:30 AM    LYMPHOCYTES 9.20 (L) 06/14/2021 03:30 AM    MONOCYTES 10.10 06/14/2021 03:30 AM    EOSINOPHILS 0.40 06/14/2021 03:30 AM    BASOPHILS 0.50  06/14/2021 03:30 AM      Lab Results   Component Value Date/Time    SODIUM 136 06/14/2021 03:30 AM    POTASSIUM 3.8 06/14/2021 03:30 AM    CHLORIDE 104 06/14/2021 03:30 AM    CO2 22 06/14/2021 03:30 AM    GLUCOSE 94 06/14/2021 03:30 AM    BUN 22 06/14/2021 03:30 AM    CREATININE 0.78 06/14/2021 03:30 AM      Lab Results   Component Value Date/Time    CHOLSTRLTOT 127 06/13/2021 01:35 AM    LDL 69 06/13/2021 01:35 AM    HDL 48 06/13/2021 01:35 AM    TRIGLYCERIDE 49 06/13/2021 01:35 AM       Lab Results   Component Value Date/Time    ALKPHOSPHAT 61 06/12/2021 06:31 PM    ASTSGOT 25 06/12/2021 06:31 PM    ALTSGPT 14 06/12/2021 06:31 PM    TBILIRUBIN 0.8 06/12/2021 06:31 PM        Imaging/Testing:    I interpreted and/or reviewed the patient's neuroimaging    MR-BRAIN-W/O   Final Result      1.  Moderate cerebral atrophy expected for the patient's age.   2.  Moderately extensive areas of acute infarction in the right MCA territory as described above. Small foci of hemorrhagic transformation at the right posterior frontal-parietal region. The most prominent focus measuring about 9 mm would appear to    correspond with the small focus of hemorrhage seen on the CT prior to the thrombectomy procedure.   3.  Moderate supratentorial white matter disease most consistent with microvascular ischemic change.   4.  Old lacunar infarct right cerebellar hemisphere superiorly.   5.  Subcentimeter lacunar size acute infarct at the inferior aspect of the right cerebellar hemisphere.   6.  Variant anatomy with a diminutive vertebrobasilar system and apparent carotid origin of both posterior cerebral arteries.   7.  6 mm aneurysm at the right MCA genu concordant with catheter angiography.      DX-CHEST-PORTABLE (1 VIEW)   Final Result      Cardiomegaly. No focal consolidation or pleural effusions.      IR-THROMBO MECHANICAL ARTERY,INIT   Final Result         89-year-old patient who presented with acute onset of left arm and leg weakness  was found to have a right M1 MCA occlusion on CT angiography with a favorable perfusion profile underwent emergent right middle cerebral artery mechanical thrombectomy as    described above. Initial angiogram demonstrated complete occlusion of the right M1 segment.      The final angiogram showed partial recanalization of the right MCA with improved flow to the superior and inferior division branches. Final recanalization score: TICI 2A      Final angiogram also demonstrated an aneurysm measuring 7 x 8 mm involving the right M1 bifurcation, which was likely the source of the thrombus.         CT-CTA NECK WITH & W/O-POST PROCESSING   Final Result      1.  Mild calcified plaque in the left carotid bulb and proximal left internal carotid artery with less than 50% stenosis.      2.  Soft tissue density in the mediastinum and hilum could represent lymphadenopathy which is incompletely visualized/evaluated on this neck CTA. Recommend follow-up chest CT.      CT-CTA HEAD WITH & W/O-POST PROCESS   Final Result      Right middle cerebral artery M1 occlusion.      Neuro IR is aware of findings.         CT-CEREBRAL PERFUSION ANALYSIS   Final Result      1.  Cerebral blood flow less than 30% likely representing completed infarct = 0 mL.      2.  T Max more than 6 seconds likely representing combination of completed infarct and ischemia = 105 mL.      3.  Mismatched volume likely representing ischemic brain/penumbra = 105 ml      4.  Please note that the cerebral perfusion was performed on the limited brain tissue around the basal ganglia region. Infarct/ischemia outside the CT perfusion sections can be missed in this study.      CT-HEAD W/O   Final Result      1.  Hyperdense right MCA with right temporal and inferior frontoparietal lobe edema with slight loss of gray-white matter differentiation consistent with infarct with subtle area of increased attenuation at the junction of the right frontoparietal lobe    could represent  some petechial hemorrhage.      2.  This was discussed with Dr. Baker at 6:55 PM who had already talked to neurology and the neurointerventionist on call.      DX-ESOPHAGUS - [id number]    (Results Pending)       Assessment and Plan:  Ángel Jordan is a 89 year old woman presenting with L side weakness, slurred speech, found to have R M1 occlusion, not a candidate for tPA as outside time window, but did undergo endovascular clot retrieval with TICI 2B reperfusion on 6/12.  Post-op BP goal 160-180 given history of aortic stenosis, and to mitigate reperfusion injury.  Improved exam post-operatively. MRI with modest stroke burden, with a small foci of hemorrhagic conversion. She has restarted on plavix therapy given breakthrough event on ASA.     Will continue with liberal BP goal given her aortic stenosis.  Deferring TTE, and long-term cardiac monitoring, as not an ideal candidate for anticoagulation given relatively advance age, large untreated aneurysm.    Problem list:  1.  R MCA stroke s/p endovascular clot retrieval of R M1 with TICI 2B reperfusion  2.  Hypertension  3.  Aortic stenosis  4.  R MCA 8mm saccular aneurysm    Plan:   - q4h neurochecks ok   - continue plavix 75 mg daily   - stroke labs:  HgbA1c and LDL 69   - may defer statin therapy given advance age and LDL at goal < 70   - ok with long-term, liberal BP goal given advance age and history of aortic stenosis- SBP goal 120-180 may be appropriate   - PT/OT/SLP    - for 8mm R MCA aneurysm- no treatment or surveillance indicated given advance age   -heparin SQ for DVT ppx   - please voalte Neurology on call with any further questions or concerns    The evaluation of the patient, and recommended management, was discussed with the ICU team. I have performed a physical exam and reviewed and updated ROS and Plan today (6/16/2021).     Tong Cai MD  Neurohospitalist, Acute Care Services

## 2021-06-16 NOTE — CARE PLAN
The patient is Stable - Low risk of patient condition declining or worsening    Shift Goals  Clinical Goals: neuro stability  Patient Goals: go home  Family Goals: MICHEAL    Progress made toward(s) clinical / shift goals:  Patient with q4 neuro checks, no changes. Remains with L sided weakness. Patient with pleasant affect, VSS, Q2 turns in place. No complaints of pain at this time. Able to communicate needs, hourly rounding in place.     Patient is not progressing towards the following goals: N/A

## 2021-06-17 PROCEDURE — 97530 THERAPEUTIC ACTIVITIES: CPT | Mod: CQ

## 2021-06-17 PROCEDURE — 700102 HCHG RX REV CODE 250 W/ 637 OVERRIDE(OP): Performed by: GENERAL PRACTICE

## 2021-06-17 PROCEDURE — 700111 HCHG RX REV CODE 636 W/ 250 OVERRIDE (IP): Performed by: HOSPITALIST

## 2021-06-17 PROCEDURE — 700102 HCHG RX REV CODE 250 W/ 637 OVERRIDE(OP): Performed by: PSYCHIATRY & NEUROLOGY

## 2021-06-17 PROCEDURE — 97116 GAIT TRAINING THERAPY: CPT | Mod: CQ

## 2021-06-17 PROCEDURE — 97112 NEUROMUSCULAR REEDUCATION: CPT | Mod: CQ

## 2021-06-17 PROCEDURE — 770006 HCHG ROOM/CARE - MED/SURG/GYN SEMI*

## 2021-06-17 PROCEDURE — 99232 SBSQ HOSP IP/OBS MODERATE 35: CPT | Performed by: GENERAL PRACTICE

## 2021-06-17 PROCEDURE — A9270 NON-COVERED ITEM OR SERVICE: HCPCS | Performed by: PSYCHIATRY & NEUROLOGY

## 2021-06-17 PROCEDURE — A9270 NON-COVERED ITEM OR SERVICE: HCPCS | Performed by: GENERAL PRACTICE

## 2021-06-17 RX ADMIN — CLOPIDOGREL BISULFATE 75 MG: 75 TABLET ORAL at 04:40

## 2021-06-17 RX ADMIN — HEPARIN SODIUM 5000 UNITS: 5000 INJECTION, SOLUTION INTRAVENOUS; SUBCUTANEOUS at 21:30

## 2021-06-17 RX ADMIN — HEPARIN SODIUM 5000 UNITS: 5000 INJECTION, SOLUTION INTRAVENOUS; SUBCUTANEOUS at 13:45

## 2021-06-17 RX ADMIN — HEPARIN SODIUM 5000 UNITS: 5000 INJECTION, SOLUTION INTRAVENOUS; SUBCUTANEOUS at 04:40

## 2021-06-17 RX ADMIN — ACETAMINOPHEN 650 MG: 325 TABLET, FILM COATED ORAL at 17:46

## 2021-06-17 ASSESSMENT — COGNITIVE AND FUNCTIONAL STATUS - GENERAL
MOBILITY SCORE: 9
TURNING FROM BACK TO SIDE WHILE IN FLAT BAD: UNABLE
CLIMB 3 TO 5 STEPS WITH RAILING: A LOT
WALKING IN HOSPITAL ROOM: A LOT
MOVING TO AND FROM BED TO CHAIR: UNABLE
MOVING FROM LYING ON BACK TO SITTING ON SIDE OF FLAT BED: UNABLE
SUGGESTED CMS G CODE MODIFIER MOBILITY: CM
STANDING UP FROM CHAIR USING ARMS: A LOT

## 2021-06-17 ASSESSMENT — GAIT ASSESSMENTS
DISTANCE (FEET): 80
DEVIATION: BRADYKINETIC;SHUFFLED GAIT
GAIT LEVEL OF ASSIST: MINIMAL ASSIST
ASSISTIVE DEVICE: FRONT WHEEL WALKER

## 2021-06-17 ASSESSMENT — ENCOUNTER SYMPTOMS: WEAKNESS: 1

## 2021-06-17 ASSESSMENT — PAIN DESCRIPTION - PAIN TYPE
TYPE: ACUTE PAIN

## 2021-06-17 NOTE — CARE PLAN
Problem: Optimal Care of the Stroke Patient  Goal: Optimal emergency care for the stroke patient  Outcome: Progressing     Problem: Psychosocial - Patient Condition  Goal: Patient's ability to verbalize feelings about condition will improve  Outcome: Progressing     Problem: Neuro Status  Goal: Neuro status will remain stable or improve  Outcome: Progressing     Problem: Dysphagia  Goal: Dysphagia will improve  Outcome: Progressing     Problem: Risk for Aspiration  Goal: Patient's risk for aspiration will be absent or decrease  Outcome: Progressing   The patient is Stable - Low risk of patient condition declining or worsening    Shift Goals  Clinical Goals: neuro stability  Patient Goals: go home  Family Goals: MICHEAL    Progress made toward(s) clinical / shift goals:  Pain management, OOB for meals, Q4 neuro checks

## 2021-06-17 NOTE — PROGRESS NOTES
Hospital Medicine Daily Progress Note    Date of Service  6/16/2021    Chief Complaint  89 y.o. female admitted 6/12/2021 with left-sided weakness    Hospital Course  89 year old woman with PMHx hypertension, osteoporosis, hx of cerebral aneurysm, and AS, presenting with L side weakness, slurred speech, found to have right MCA CVA with aneurysm not a candidate for tPA as outside time window, but did undergo endovascular clot retrieval on 6/12.    She has restarted on plavix therapy given breakthrough event on ASA.    Patient evaluated by SLP, modified barium swallow was performed, patient started on a modified diet with supervision.    PT/OT recommended SNF, we are pending insurance authorization for [facility] rehab.      Interval Problem Update  Patient was seen and examined at bedside.  Patient reports no acute events.  There were concerns the patient was still having difficulty swallowing.  We had speech reevaluate the patient, they recommend to continue with a modified diet with supervision.    Patient has not had a bowel movement admission, aggressive bowel regimen in place.    Otherwise patient is medically stable for discharge to SNF once insurance authorizes placement to [facility] rehab.    Consultants/Specialty  Neurology    Code Status  DNAR/DNI    Disposition  TBD    Review of Systems  Review of Systems   Neurological: Positive for weakness.   All other systems reviewed and are negative.       Physical Exam  Temp:  [36.4 °C (97.5 °F)-37.1 °C (98.8 °F)] 36.8 °C (98.3 °F)  Pulse:  [60-74] 73  Resp:  [17] 17  BP: (114-146)/(62-74) 133/62  SpO2:  [90 %-93 %] 93 %    Physical Exam  Vitals and nursing note reviewed.   Constitutional:       General: She is not in acute distress.     Appearance: Normal appearance.   HENT:      Head: Normocephalic and atraumatic.      Mouth/Throat:      Mouth: Mucous membranes are moist.      Pharynx: No oropharyngeal exudate.   Eyes:      Extraocular Movements:  Extraocular movements intact.      Pupils: Pupils are equal, round, and reactive to light.   Cardiovascular:      Rate and Rhythm: Normal rate and regular rhythm.      Pulses: Normal pulses.      Heart sounds: No murmur heard.   No friction rub. No gallop.    Pulmonary:      Effort: Pulmonary effort is normal. No respiratory distress.      Breath sounds: No wheezing, rhonchi or rales.   Abdominal:      General: Bowel sounds are normal. There is no distension.      Palpations: Abdomen is soft. There is no mass.      Tenderness: There is no abdominal tenderness.   Musculoskeletal:         General: No swelling or tenderness. Normal range of motion.      Cervical back: Normal range of motion. No rigidity. No muscular tenderness.      Right lower leg: No edema.      Left lower leg: No edema.   Skin:     General: Skin is warm and dry.      Capillary Refill: Capillary refill takes less than 2 seconds.      Findings: No erythema or rash.   Neurological:      General: No focal deficit present.      Mental Status: She is alert and oriented to person, place, and time.      Motor: No weakness.      Gait: Gait normal.         Fluids    Intake/Output Summary (Last 24 hours) at 6/16/2021 1715  Last data filed at 6/16/2021 0550  Gross per 24 hour   Intake --   Output 500 ml   Net -500 ml       Laboratory  Recent Labs     06/14/21  0330   WBC 10.3   RBC 3.16*   HEMOGLOBIN 9.6*   HEMATOCRIT 29.1*   MCV 92.1   MCH 30.4   MCHC 33.0*   RDW 53.4*   PLATELETCT 151*   MPV 11.3     Recent Labs     06/14/21  0330   SODIUM 136   POTASSIUM 3.8   CHLORIDE 104   CO2 22   GLUCOSE 94   BUN 22   CREATININE 0.78   CALCIUM 8.8                   Imaging  DX-ESOPHAGUS - [id number]   Final Result      MR-BRAIN-W/O   Final Result      1.  Moderate cerebral atrophy expected for the patient's age.   2.  Moderately extensive areas of acute infarction in the right MCA territory as described above. Small foci of hemorrhagic transformation at the right  posterior frontal-parietal region. The most prominent focus measuring about 9 mm would appear to    correspond with the small focus of hemorrhage seen on the CT prior to the thrombectomy procedure.   3.  Moderate supratentorial white matter disease most consistent with microvascular ischemic change.   4.  Old lacunar infarct right cerebellar hemisphere superiorly.   5.  Subcentimeter lacunar size acute infarct at the inferior aspect of the right cerebellar hemisphere.   6.  Variant anatomy with a diminutive vertebrobasilar system and apparent carotid origin of both posterior cerebral arteries.   7.  6 mm aneurysm at the right MCA genu concordant with catheter angiography.      DX-CHEST-PORTABLE (1 VIEW)   Final Result      Cardiomegaly. No focal consolidation or pleural effusions.      IR-THROMBO MECHANICAL ARTERY,INIT   Final Result         89-year-old patient who presented with acute onset of left arm and leg weakness was found to have a right M1 MCA occlusion on CT angiography with a favorable perfusion profile underwent emergent right middle cerebral artery mechanical thrombectomy as    described above. Initial angiogram demonstrated complete occlusion of the right M1 segment.      The final angiogram showed partial recanalization of the right MCA with improved flow to the superior and inferior division branches. Final recanalization score: TICI 2A      Final angiogram also demonstrated an aneurysm measuring 7 x 8 mm involving the right M1 bifurcation, which was likely the source of the thrombus.         CT-CTA NECK WITH & W/O-POST PROCESSING   Final Result      1.  Mild calcified plaque in the left carotid bulb and proximal left internal carotid artery with less than 50% stenosis.      2.  Soft tissue density in the mediastinum and hilum could represent lymphadenopathy which is incompletely visualized/evaluated on this neck CTA. Recommend follow-up chest CT.      CT-CTA HEAD WITH & W/O-POST PROCESS   Final  Result      Right middle cerebral artery M1 occlusion.      Neuro IR is aware of findings.         CT-CEREBRAL PERFUSION ANALYSIS   Final Result      1.  Cerebral blood flow less than 30% likely representing completed infarct = 0 mL.      2.  T Max more than 6 seconds likely representing combination of completed infarct and ischemia = 105 mL.      3.  Mismatched volume likely representing ischemic brain/penumbra = 105 ml      4.  Please note that the cerebral perfusion was performed on the limited brain tissue around the basal ganglia region. Infarct/ischemia outside the CT perfusion sections can be missed in this study.      CT-HEAD W/O   Final Result      1.  Hyperdense right MCA with right temporal and inferior frontoparietal lobe edema with slight loss of gray-white matter differentiation consistent with infarct with subtle area of increased attenuation at the junction of the right frontoparietal lobe    could represent some petechial hemorrhage.      2.  This was discussed with Dr. Baker at 6:55 PM who had already talked to neurology and the neurointerventionist on call.           Assessment/Plan  * Acute right MCA stroke (HCC)- (present on admission)  Assessment & Plan  Patient noted to have Right sided M1 occlusion  Status post mechanical thrombectomy on 6/12  Lipitor 40 mg po qhs   Neuro check Q4   plavix  PT OT eval  Plan rehab DC  Not doing an agressive work up given pt's advanced age    Age-related physical debility  Assessment & Plan  PT/OT eval     Hypertension- (present on admission)  Assessment & Plan  Uncontrolled  Start Norvasc 5mg   Has not required PRN coverage/24hrs  Neurology is recommending neuro blood pressure control 120 - 180     Aortic stenosis- (present on admission)  Assessment & Plan  Reports hx of aortic stenosis, monitor cardiac output especially during thrombectomy  Impressive murmur         VTE prophylaxis: SCDs, heparin

## 2021-06-17 NOTE — FACE TO FACE
Face to Face Supporting Documentation - Home Health    The encounter with this patient was in whole or in part the primary reason for home health admission.    Date of encounter:   Patient:                    MRN:                       YOB: 2021  Sabiha Bingham  [id number]  11/6/1921     Home health to see patient for:  Skilled Nursing care for assessment, interventions & education, Home health aide, Physical Therapy evaluation and treatment, Occupational therapy evaluation and treatment and Speech Language Pathology evaluation and treatment    Skilled need for:  New Onset Medical Diagnosis CVA    Skilled nursing interventions to include:  Comment: PT/OT/SLP    Homebound status evidenced by:  Need the aid of supportive devices such as crutches, canes, wheelchairs or walkers. Leaving home requires a considerable and taxing effort. There is a normal inability to leave the home.    Community Physician to provide follow up care: No primary care provider on file.     Optional Interventions? No      I certify the face to face encounter for this home health care referral meets the CMS requirements and the encounter/clinical assessment with the patient was, in whole, or in part, for the medical condition(s) listed above, which is the primary reason for home health care. Based on my clinical findings: the service(s) are medically necessary, support the need for home health care, and the homebound criteria are met.  I certify that this patient has had a face to face encounter by myself.  Wendy Pike D.O. - TIESHAI: [phone]

## 2021-06-17 NOTE — DISCHARGE PLANNING
Care Transition Team Discharge Planning    Anticipates discharge disposition:  LTAC vs SNF    Action:  • This RN CM left a VM to Pt's granddaughter / emergency contact April and explained that Pt is still pending Insurance  auth  at [facility] and requested April to return my call so we can talk about discharge plan and SNF choice, Awaiting call from April.  • 14;00 Recived a call from April who states sthat she does not want to give any SNF choice as she wants Pt to discharge to [facility]    14:15 Received a call from Fang, Liaison with [facility] that Pt 's Insurance declined Pt's discharge to Acute Rehab. Fang to call April,Pt's granddaughter.    15:40 Received a call  from April whoi is frustrated that  Pt got declined at [facility], Explained that Insurance declined Pt. April still refusing to give SNF choices and prefers to have a meeting  with Case Managemant and Hospitalist  olaf, Per April she plans to take Pt home with Home Health and she can pay caregivers .  Informed Dr Pike,        Barriers to Discharge:       Pending medical clearance  • Pending placement  • Pending discharge disposition     Plan:  • Will continue to assist Pt with discharge as needed

## 2021-06-17 NOTE — THERAPY
Physical Therapy   Daily Treatment     Patient Name: Sabiha Bingham  Age:  89 y.o., Sex:  female  Medical Record #: [id number]  Today's Date: 6/17/2021     Precautions: (P) Fall Risk, Swallow Precautions ( See Comments)    Assessment    Pt found seated visiting w/her grand daughter, willing to participate w/PT. Pt currently functioning @ min<->mod assist w/her bed mobility, functional transfers, and her amb efforts w/FWW. Pt's amb efforts mod assist w/change in direction and for walker management. Recommend IPR before returning home w/family support. Pt was indep PTA, amb wo/AD.     Plan    Continue current treatment plan.    DC Equipment Recommendations: Unable to determine at this time  Discharge Recommendations: Recommend post-acute placement for additional physical therapy services prior to discharge home (recommend PMR consult)     Objective       06/17/21 1315   Other Treatments   Other Treatments Provided Time needed on the toilet, pt needing cueing to cont w/task. Very talkative.    Balance   Sitting Balance (Static) Fair -   Sitting Balance (Dynamic) Fair -   Standing Balance (Static) Poor +   Standing Balance (Dynamic) Poor   Weight Shift Sitting Fair   Weight Shift Standing Poor   Comments standing w/FWW   Gait Analysis   Gait Level Of Assist Minimal Assist   Assistive Device Front Wheel Walker   Distance (Feet) 80   # of Times Distance was Traveled 1   Deviation Bradykinetic;Shuffled Gait   Skilled Intervention Verbal Cuing;Postural Facilitation;Compensatory Strategies   Comments Improvement w/pts amb efforts from previous PT session. Assist level min->mod w/change in direction using the walker. Pt needing walker management w/her turns.    Bed Mobility    Supine to Sit   (pt found seated in chair visiting w/granddtr. )   Sit to Supine Moderate Assist   Scooting Minimal Assist  (seated)   Rolling Minimum Assist to Lt.   Skilled Intervention Verbal Cuing;Postural Facilitation;Compensatory Strategies   Comments  Pt conts to need assistance w/her bed mobility tasks to flat surface and use of rails.    Functional Mobility   Sit to Stand Minimal Assist  (from chair height->FWW)   Bed, Chair, Wheelchair Transfer Moderate Assist  (w/FWW)   Skilled Intervention Verbal Cuing;Postural Facilitation;Compensatory Strategies   Comments Cueing needed for hand placement when coming to stand to the walker. Mod assist w/her transfers using the walker, primarily for walker management.    How much difficulty does the patient currently have...   Turning over in bed (including adjusting bedclothes, sheets and blankets)? 1   Sitting down on and standing up from a chair with arms (e.g., wheelchair, bedside commode, etc.) 1   Moving from lying on back to sitting on the side of the bed? 1   How much help from another person does the patient currently need...   Moving to and from a bed to a chair (including a wheelchair)? 2   Need to walk in a hospital room? 2   Climbing 3-5 steps with a railing? 2   6 clicks Mobility Score 9   Short Term Goals    Short Term Goal # 1 pt will perform supine <> sit without bed features with SPV in 6 visits to be able to get in/out of bed at home   Goal Outcome # 1 goal not met   Short Term Goal # 2 pt will perform all functional xfrs with SPV in 6 visits for improved independence   Goal Outcome # 2 Goal not met   Short Term Goal # 3 pt will ambulate 150ft with FWW and SPV in 6 visits to access home   Goal Outcome # 3 Goal not met   Short Term Goal # 4 pt will negotiate 2 steps with SPV in 6 visits to access home   Goal Outcome # 4 Goal not met

## 2021-06-17 NOTE — PROGRESS NOTES
Hospital Medicine Daily Progress Note    Date of Service  6/17/2021    Chief Complaint  89 y.o. female admitted 6/12/2021 with left-sided weakness    Hospital Course  89 year old woman with PMHx hypertension, osteoporosis, hx of cerebral aneurysm, and AS, presenting with L side weakness, slurred speech, found to have right MCA CVA with aneurysm not a candidate for tPA as outside time window, but did undergo endovascular clot retrieval on 6/12.    She has restarted on plavix therapy given breakthrough event on ASA.    Patient evaluated by SLP, modified barium swallow was performed, patient started on a modified diet with supervision.    PT/OT recommended SNF, we are pending insurance authorization for [facility] re[facility].      Interval Problem Update  Patient was seen and examined at bedside.  Patient was seen sitting up in chair at bedside.  She had a large bowel movement last night.  She is tolerating her modified diet with supervision.    Patient is medically stable for discharge to SNF once insurance authorizes placement to [facility] rehab.    Consultants/Specialty  Neurology    Code Status  DNAR/DNI    Disposition  TBD    Review of Systems  Review of Systems   Neurological: Positive for weakness.   All other systems reviewed and are negative.       Physical Exam  Temp:  [36.4 °C (97.5 °F)-37.2 °C (98.9 °F)] 36.6 °C (97.8 °F)  Pulse:  [68-93] 68  Resp:  [17] 17  BP: ()/(54-70) 137/54  SpO2:  [93 %-95 %] 95 %    Physical Exam  Vitals and nursing note reviewed.   Constitutional:       General: She is not in acute distress.     Appearance: Normal appearance.   HENT:      Head: Normocephalic and atraumatic.      Mouth/Throat:      Mouth: Mucous membranes are moist.      Pharynx: No oropharyngeal exudate.   Eyes:      Extraocular Movements: Extraocular movements intact.      Pupils: Pupils are equal, round, and reactive to light.   Cardiovascular:      Rate and Rhythm: Normal rate and regular rhythm.       Pulses: Normal pulses.      Heart sounds: No murmur heard.   No friction rub. No gallop.    Pulmonary:      Effort: Pulmonary effort is normal. No respiratory distress.      Breath sounds: No wheezing, rhonchi or rales.   Abdominal:      General: Bowel sounds are normal. There is no distension.      Palpations: Abdomen is soft. There is no mass.      Tenderness: There is no abdominal tenderness.   Musculoskeletal:         General: No swelling or tenderness. Normal range of motion.      Cervical back: Normal range of motion. No rigidity. No muscular tenderness.      Right lower leg: No edema.      Left lower leg: No edema.   Skin:     General: Skin is warm and dry.      Capillary Refill: Capillary refill takes less than 2 seconds.      Findings: No erythema or rash.   Neurological:      General: No focal deficit present.      Mental Status: She is alert and oriented to person, place, and time.      Motor: No weakness.      Gait: Gait normal.         Fluids    Intake/Output Summary (Last 24 hours) at 6/17/2021 1201  Last data filed at 6/17/2021 0600  Gross per 24 hour   Intake --   Output 100 ml   Net -100 ml       Laboratory                        Imaging  DX-ESOPHAGUS - [id number]   Final Result      MR-BRAIN-W/O   Final Result      1.  Moderate cerebral atrophy expected for the patient's age.   2.  Moderately extensive areas of acute infarction in the right MCA territory as described above. Small foci of hemorrhagic transformation at the right posterior frontal-parietal region. The most prominent focus measuring about 9 mm would appear to    correspond with the small focus of hemorrhage seen on the CT prior to the thrombectomy procedure.   3.  Moderate supratentorial white matter disease most consistent with microvascular ischemic change.   4.  Old lacunar infarct right cerebellar hemisphere superiorly.   5.  Subcentimeter lacunar size acute infarct at the inferior aspect of the right cerebellar hemisphere.    6.  Variant anatomy with a diminutive vertebrobasilar system and apparent carotid origin of both posterior cerebral arteries.   7.  6 mm aneurysm at the right MCA genu concordant with catheter angiography.      DX-CHEST-PORTABLE (1 VIEW)   Final Result      Cardiomegaly. No focal consolidation or pleural effusions.      IR-THROMBO MECHANICAL ARTERY,INIT   Final Result         89-year-old patient who presented with acute onset of left arm and leg weakness was found to have a right M1 MCA occlusion on CT angiography with a favorable perfusion profile underwent emergent right middle cerebral artery mechanical thrombectomy as    described above. Initial angiogram demonstrated complete occlusion of the right M1 segment.      The final angiogram showed partial recanalization of the right MCA with improved flow to the superior and inferior division branches. Final recanalization score: TICI 2A      Final angiogram also demonstrated an aneurysm measuring 7 x 8 mm involving the right M1 bifurcation, which was likely the source of the thrombus.         CT-CTA NECK WITH & W/O-POST PROCESSING   Final Result      1.  Mild calcified plaque in the left carotid bulb and proximal left internal carotid artery with less than 50% stenosis.      2.  Soft tissue density in the mediastinum and hilum could represent lymphadenopathy which is incompletely visualized/evaluated on this neck CTA. Recommend follow-up chest CT.      CT-CTA HEAD WITH & W/O-POST PROCESS   Final Result      Right middle cerebral artery M1 occlusion.      Neuro IR is aware of findings.         CT-CEREBRAL PERFUSION ANALYSIS   Final Result      1.  Cerebral blood flow less than 30% likely representing completed infarct = 0 mL.      2.  T Max more than 6 seconds likely representing combination of completed infarct and ischemia = 105 mL.      3.  Mismatched volume likely representing ischemic brain/penumbra = 105 ml      4.  Please note that the cerebral perfusion was  performed on the limited brain tissue around the basal ganglia region. Infarct/ischemia outside the CT perfusion sections can be missed in this study.      CT-HEAD W/O   Final Result      1.  Hyperdense right MCA with right temporal and inferior frontoparietal lobe edema with slight loss of gray-white matter differentiation consistent with infarct with subtle area of increased attenuation at the junction of the right frontoparietal lobe    could represent some petechial hemorrhage.      2.  This was discussed with Dr. Baker at 6:55 PM who had already talked to neurology and the neurointerventionist on call.           Assessment/Plan  * Acute right MCA stroke (HCC)- (present on admission)  Assessment & Plan  Patient noted to have Right sided M1 occlusion  Status post mechanical thrombectomy on 6/12  Lipitor 40 mg po qhs   Neuro check Q4   plavix  PT OT eval  Plan rehab DC  Not doing an agressive work up given pt's advanced age    Age-related physical debility  Assessment & Plan  PT/OT eval     Hypertension- (present on admission)  Assessment & Plan  Uncontrolled  Start Norvasc 5mg   Has not required PRN coverage/24hrs  Neurology is recommending neuro blood pressure control 120 - 180     Aortic stenosis- (present on admission)  Assessment & Plan  Reports hx of aortic stenosis, monitor cardiac output especially during thrombectomy  Impressive murmur       VTE prophylaxis: SCDs, heparin

## 2021-06-17 NOTE — CARE PLAN
The patient is Stable - Low risk of patient condition declining or worsening    Shift Goals  Clinical Goals: neuro stability, nutrition/hydration, placement  Patient Goals: d/c home  Family Goals: MICHEAL    Progress made toward(s) clinical / shift goals:  Patient progressing toward goals, pleasant demeanor and A+Ox4. Patient ate over 70% of dinner. Patient had 2 BM thus far, both episodes of incontinence. Q2 turns in place, bed locked and in low position, bed alarm on. Hourly rounding in place.     Patient is not progressing towards the following goals:      Problem: Dysphagia  Goal: Dysphagia will improve  Outcome: Not Progressing  Note: Patient on modified diet, continues to have dysphagia. Actively working with SLP. Patient with 1:1 supervision during dinner, recalling swallow strategies and effectively throat clearing.

## 2021-06-17 NOTE — THERAPY
Occupational Therapy  Daily Treatment     Patient Name: Sabiha Bingham  Age:  89 y.o., Sex:  female  Medical Record #: [id number]  Today's Date: 6/16/2021       Precautions: Fall Risk, Swallow Precautions ( See Comments)  Comments: L neglect    Assessment    Pt seen for OT tx. Continues to be limited by decreased activity tolerance, balance deficits, inattention, L neglect and LUE weakness impacting ability to complete ADLs and ADL transfers independently. Mod A supine > sit, mod A sit > stand, mod A txf from EOB > chair. LUE continues to be grossly weak. L neglect w/ mod cues to attend to L side. Pleasant and cooperative. Will continue to benefit from OT services while in house.    Plan    Continue current treatment plan.    DC Equipment Recommendations: Unable to determine at this time  Discharge Recommendations: Recommend post-acute placement for additional occupational therapy services prior to discharge home       06/16/21 1225   Cognition    Cognition / Consciousness X   Attention Impaired   Sequencing Impaired   Active ROM Upper Body   Active ROM Upper Body  X   Comments LUE grossly weak but able to complete ROM on all planes    Strength Upper Body   Upper Body Strength  X   Comments LUE grossly weak   Balance   Sitting Balance (Static) Fair -   Sitting Balance (Dynamic) Fair -   Standing Balance (Static) Poor +   Standing Balance (Dynamic) Poor   Weight Shift Sitting Fair   Weight Shift Standing Poor   Bed Mobility    Supine to Sit Moderate Assist   Sit to Supine   (left up in chair )   Scooting Maximal Assist   Activities of Daily Living   Grooming Minimal Assist;Seated   Upper Body Dressing Minimal Assist   Lower Body Dressing Maximal Assist   Toileting Maximal Assist   Functional Mobility   Sit to Stand Moderate Assist   Bed, Chair, Wheelchair Transfer Moderate Assist   Short Term Goals   Short Term Goal # 1 pt will demo toilet txf with supv   Goal Outcome # 1 Goal not met   Short Term Goal # 2 pt will dress LB  with supv   Goal Outcome # 2 Goal not met   Short Term Goal # 3 pt will groom in stance at the sink with supv   Goal Outcome # 3 Goal not met   Short Term Goal # 4 pt will participate in activities to improve LUE function w/ supv   Goal Outcome # 4 Progressing as expected   Short Term Goal # 5 pt will locate 3/3 objects to left w/ min cueing   Goal Outcome # 5 Progressing as expected   Anticipated Discharge Equipment and Recommendations   DC Equipment Recommendations Unable to determine at this time   Discharge Recommendations Recommend post-acute placement for additional occupational therapy services prior to discharge home

## 2021-06-17 NOTE — CARE PLAN
Problem: Optimal Care of the Stroke Patient  Goal: Optimal emergency care for the stroke patient  Outcome: Progressing     Problem: Discharge Planning - Stroke  Goal: Ensure Stroke Core Measures are met prior to discharge  Outcome: Progressing     Problem: Neuro Status  Goal: Neuro status will remain stable or improve  Outcome: Progressing     Problem: Dysphagia  Goal: Dysphagia will improve  Outcome: Progressing   The patient is Stable - Low risk of patient condition declining or worsening    Shift Goals  Clinical Goals: neuro stability, nutrition/hydration, placement  Patient Goals: d/c home  Family Goals: MICHEAL    Progress made toward(s) clinical / shift goals:  1:1 feeds, fall risk precautions, oral care.     Patient is not progressing towards the following goals:

## 2021-06-18 PROCEDURE — 700102 HCHG RX REV CODE 250 W/ 637 OVERRIDE(OP): Performed by: PSYCHIATRY & NEUROLOGY

## 2021-06-18 PROCEDURE — A9270 NON-COVERED ITEM OR SERVICE: HCPCS | Performed by: PSYCHIATRY & NEUROLOGY

## 2021-06-18 PROCEDURE — 99232 SBSQ HOSP IP/OBS MODERATE 35: CPT | Performed by: GENERAL PRACTICE

## 2021-06-18 PROCEDURE — 700102 HCHG RX REV CODE 250 W/ 637 OVERRIDE(OP): Performed by: GENERAL PRACTICE

## 2021-06-18 PROCEDURE — 700102 HCHG RX REV CODE 250 W/ 637 OVERRIDE(OP): Performed by: HOSPITALIST

## 2021-06-18 PROCEDURE — 97112 NEUROMUSCULAR REEDUCATION: CPT | Mod: CO

## 2021-06-18 PROCEDURE — A9270 NON-COVERED ITEM OR SERVICE: HCPCS | Performed by: GENERAL PRACTICE

## 2021-06-18 PROCEDURE — A9270 NON-COVERED ITEM OR SERVICE: HCPCS | Performed by: HOSPITALIST

## 2021-06-18 PROCEDURE — 97535 SELF CARE MNGMENT TRAINING: CPT | Mod: CO

## 2021-06-18 PROCEDURE — 770006 HCHG ROOM/CARE - MED/SURG/GYN SEMI*

## 2021-06-18 PROCEDURE — 700111 HCHG RX REV CODE 636 W/ 250 OVERRIDE (IP): Performed by: HOSPITALIST

## 2021-06-18 RX ADMIN — DOCUSATE SODIUM 50 MG AND SENNOSIDES 8.6 MG 2 TABLET: 8.6; 5 TABLET, FILM COATED ORAL at 05:16

## 2021-06-18 RX ADMIN — AMLODIPINE BESYLATE 5 MG: 5 TABLET ORAL at 05:16

## 2021-06-18 RX ADMIN — HEPARIN SODIUM 5000 UNITS: 5000 INJECTION, SOLUTION INTRAVENOUS; SUBCUTANEOUS at 14:26

## 2021-06-18 RX ADMIN — HEPARIN SODIUM 5000 UNITS: 5000 INJECTION, SOLUTION INTRAVENOUS; SUBCUTANEOUS at 05:16

## 2021-06-18 RX ADMIN — HEPARIN SODIUM 5000 UNITS: 5000 INJECTION, SOLUTION INTRAVENOUS; SUBCUTANEOUS at 22:21

## 2021-06-18 RX ADMIN — CLOPIDOGREL BISULFATE 75 MG: 75 TABLET ORAL at 05:16

## 2021-06-18 ASSESSMENT — COGNITIVE AND FUNCTIONAL STATUS - GENERAL
EATING MEALS: A LITTLE
SUGGESTED CMS G CODE MODIFIER DAILY ACTIVITY: CK
HELP NEEDED FOR BATHING: A LOT
PERSONAL GROOMING: A LITTLE
TOILETING: A LITTLE
DAILY ACTIVITIY SCORE: 16
DRESSING REGULAR UPPER BODY CLOTHING: A LITTLE
DRESSING REGULAR LOWER BODY CLOTHING: A LOT

## 2021-06-18 ASSESSMENT — PAIN DESCRIPTION - PAIN TYPE
TYPE: ACUTE PAIN
TYPE: ACUTE PAIN

## 2021-06-18 ASSESSMENT — ENCOUNTER SYMPTOMS: WEAKNESS: 1

## 2021-06-18 NOTE — DISCHARGE PLANNING
Anticipated Discharge Disposition:   Home  Home Health    Action:    Pt admtted with acute right MCA stroke.    RN CM spoke with patient and her granddaughter today.  Pt was living alone in a single story house in [place].  She has 3 canes and a walker.  She was driving.  April stated that she sold her house in [place] and is living in a 5th wheel in [place].  She can permanently live with patient as long as needed.      NICOLE MCGEE spoke with Fang with [facility] today and patient declined.  They will not do an appeal.      April informed and adamantly opposing SNF.  She is agreeable with home health.  Choice obtained for [facility] as [facility] is in network.  Choice form faxed to [facility].    NICOLE MCGEE spoke with Simona with [facility].  They are unable to process the referral until Monday.     NICOLE MCGEE spoke to April via telephone and informed her that [facility] unable to process the referral until this Monday.      Barriers to Discharge:    Home health acceptance    Plan:    F/U with [facility] on Monday 6-21-21.    Care Transition Team Assessment    Information Source  Orientation Level: Oriented X4  Information Given By: Patient, Relative  Informant's Name: Concha Bingham  Who is responsible for making decisions for patient? : Patient    Readmission Evaluation  Is this a readmission?: No    Elopement Risk  Legal Hold: No  Ambulatory or Self Mobile in Wheelchair: No-Not an Elopement Risk  Elopement Risk: Not at Risk for Elopement    Interdisciplinary Discharge Planning  Lives with - Patient's Self Care Capacity: Alone and Able to Care For Self  Housing / Facility: 1 Story House  Prior Services: Intermittent Physical Support for ADL Per Family, Housekeeping / Homemaker Services    Discharge Preparedness  What is your plan after discharge?: Home with help, Home health care  What are your discharge supports?: Other (comment)  Prior Functional Level: Ambulatory, Drives Self, Independent with Activities of Daily Living, Independent with  Medication Management, Uses Cane, Uses Walker  Difficulity with ADLs: Bathing, Dressing, Eating, Toileting, Walking  Difficulity with IADLs: Cooking, Driving, Keeping track of finances, Laundry, Managing medication, Shopping    Functional Assesment  Prior Functional Level: Ambulatory, Drives Self, Independent with Activities of Daily Living, Independent with Medication Management, Uses Cane, Uses Walker    Finances  Financial Barriers to Discharge: No  Prescription Coverage: Yes    Vision / Hearing Impairment  Right Eye Vision: Impaired  Left Eye Vision: Impaired         Advance Directive  Advance Directive?: None    Domestic Abuse  Have you ever been the victim of abuse or violence?: No         Discharge Risks or Barriers  Discharge risks or barriers?: No    Anticipated Discharge Information  Discharge Disposition: D/T to home under A care in anticipation of covered skilled care (06)  Discharge Address: [address] [place] [address] Madhu KRAFT  [id number]  Discharge Contact Phone Number: [phone]

## 2021-06-18 NOTE — DISCHARGE PLANNING
Received Choice form at 1636  Agency/Facility Name: [facility]  Referral sent per Choice form @ 1297

## 2021-06-18 NOTE — CARE PLAN
The patient is Stable - Low risk of patient condition declining or worsening    Shift Goals  Clinical Goals: Ambulation, no falls during shift  Patient Goals: d/c home  Family Goals: MICHEAL    Progress made toward(s) clinical / shift goals:  No falls during shift. Pt using FWW for frequent ambulation around room. Plan of care discussed with pt and granddaughter.       Problem: Optimal Care of the Stroke Patient  Goal: Optimal emergency care for the stroke patient  Outcome: Progressing  Goal: Optimal acute care for the stroke patient  Outcome: Progressing     Problem: Knowledge Deficit - Stroke Education  Goal: Patient's knowledge of stroke and risk factors will improve  Outcome: Progressing     Problem: Psychosocial - Patient Condition  Goal: Patient's ability to verbalize feelings about condition will improve  Outcome: Progressing  Goal: Patient's ability to re-evaluate and adapt role responsibilities will improve  Outcome: Progressing     Problem: Discharge Planning - Stroke  Goal: Ensure Stroke Core Measures are met prior to discharge  Outcome: Progressing  Goal: Patient’s continuum of care needs will be met  Outcome: Progressing     Problem: Neuro Status  Goal: Neuro status will remain stable or improve  Outcome: Progressing     Problem: Hemodynamic Monitoring  Goal: Patient's hemodynamics, fluid balance and neurologic status will be stable or improve  Outcome: Progressing     Problem: Respiratory - Stroke Patient  Goal: Patient will achieve/maintain optimum respiratory rate/effort  Outcome: Progressing     Problem: Dysphagia  Goal: Dysphagia will improve  Outcome: Progressing     Problem: Risk for Aspiration  Goal: Patient's risk for aspiration will be absent or decrease  Outcome: Progressing     Problem: Urinary Elimination  Goal: Establish and maintain regular urinary output  Outcome: Progressing     Problem: Bowel Elimination  Goal: Establish and maintain regular bowel function  Outcome: Progressing      Problem: Mobility - Stroke  Goal: Patient's capacity to carry out activities will improve  Outcome: Progressing  Goal: Spasticity will be prevented or improved  Outcome: Progressing  Goal: Subluxation will be prevented or improved  Outcome: Progressing     Problem: Self Care  Goal: Patient will have the ability to perform ADLs independently or with assistance (bathe, groom, dress, toilet and feed)  Outcome: Progressing     Problem: Knowledge Deficit - Standard  Goal: Patient and family/care givers will demonstrate understanding of plan of care, disease process/condition, diagnostic tests and medications  Outcome: Progressing     Problem: Fall Risk  Goal: Patient will remain free from falls  Outcome: Progressing     Problem: Pain - Standard  Goal: Alleviation of pain or a reduction in pain to the patient’s comfort goal  Outcome: Progressing

## 2021-06-18 NOTE — DISCHARGE PLANNING
Anticipated Discharge Disposition:   Acute Rehab    Action:    NICOLE MCGEE contacted NICOLE Murray with [employer].  Updated OT/PT/SLP notes sent as requested.  She will resubmit the therapy notes to her medical director.    Barriers to Discharge:    Insurance authorization    Plan:    F/U with Sherri and appeal if necessary.

## 2021-06-18 NOTE — CARE PLAN
The patient is Stable - Low risk of patient condition declining or worsening    Shift Goals  Clinical Goals: neuro stability/nutrition  Patient Goals: d/c home  Family Goals: MICHEAL    Progress made toward(s) clinical / shift goals:  Patient was educated on care plan and has all aspiration precautions in place and uses techniques provided by SLP.    Problem: Knowledge Deficit - Stroke Education  Goal: Patient's knowledge of stroke and risk factors will improve  Outcome: Progressing     Problem: Risk for Aspiration  Goal: Patient's risk for aspiration will be absent or decrease  Outcome: Progressing       Patient is not progressing towards the following goals:

## 2021-06-18 NOTE — CARE PLAN
Problem: Nutritional:  Goal: Achieve adequate nutritional intake  Description: Advance diet as tolerated. Patient will consume >50% of meals.  Outcome: Met    PO intake generally greater than 50% + good intake of boost supplements.   RD to follow weekly.

## 2021-06-18 NOTE — PROGRESS NOTES
Hospital Medicine Daily Progress Note    Date of Service  6/18/2021    Chief Complaint  89 y.o. female admitted 6/12/2021 with left-sided weakness    Hospital Course  89 year old woman with PMHx hypertension, osteoporosis, hx of cerebral aneurysm, and AS, presenting with L side weakness, slurred speech, found to have right MCA CVA with aneurysm not a candidate for tPA as outside time window, but did undergo endovascular clot retrieval on 6/12.    She has restarted on plavix therapy given breakthrough event on ASA.    Patient evaluated by SLP, modified barium swallow was performed, patient started on a modified diet with supervision.    PT/OT recommended SNF.      Interval Problem Update  Patient was seen and examined at bedside.     Insurance denied authorization for [facility].  Case management involved to help appeal the denial.    Patient is medically stable for discharge to SNF/ acute rehab.     Granddaughter does not want the patient admitted to a skilled nursing facility.  If acute rehab is not an option for the patient, she will opt for home health and will pay for 24/7 supervision.    Consultants/Specialty  Neurology    Code Status  DNAR/DNI    Disposition  TBD    Review of Systems  Review of Systems   Neurological: Positive for weakness.   All other systems reviewed and are negative.       Physical Exam  Temp:  [36.2 °C (97.1 °F)-36.8 °C (98.2 °F)] 36.8 °C (98.2 °F)  Pulse:  [64-76] 74  Resp:  [17-18] 18  BP: (125-149)/(63-73) 149/73  SpO2:  [92 %-95 %] 95 %    Physical Exam  Vitals and nursing note reviewed.   Constitutional:       General: She is not in acute distress.     Appearance: Normal appearance.   HENT:      Head: Normocephalic and atraumatic.      Mouth/Throat:      Mouth: Mucous membranes are moist.      Pharynx: No oropharyngeal exudate.   Eyes:      Extraocular Movements: Extraocular movements intact.      Pupils: Pupils are equal, round, and reactive to light.   Cardiovascular:       Rate and Rhythm: Normal rate and regular rhythm.      Pulses: Normal pulses.      Heart sounds: No murmur heard.   No friction rub. No gallop.    Pulmonary:      Effort: Pulmonary effort is normal. No respiratory distress.      Breath sounds: No wheezing, rhonchi or rales.   Abdominal:      General: Bowel sounds are normal. There is no distension.      Palpations: Abdomen is soft. There is no mass.      Tenderness: There is no abdominal tenderness.   Musculoskeletal:         General: No swelling or tenderness. Normal range of motion.      Cervical back: Normal range of motion. No rigidity. No muscular tenderness.      Right lower leg: No edema.      Left lower leg: No edema.   Skin:     General: Skin is warm and dry.      Capillary Refill: Capillary refill takes less than 2 seconds.      Findings: No erythema or rash.   Neurological:      General: No focal deficit present.      Mental Status: She is alert and oriented to person, place, and time.      Motor: No weakness.      Gait: Gait normal.         Fluids    Intake/Output Summary (Last 24 hours) at 6/18/2021 1435  Last data filed at 6/18/2021 0600  Gross per 24 hour   Intake 65 ml   Output 200 ml   Net -135 ml       Laboratory                        Imaging  DX-ESOPHAGUS - [id number]   Final Result      MR-BRAIN-W/O   Final Result      1.  Moderate cerebral atrophy expected for the patient's age.   2.  Moderately extensive areas of acute infarction in the right MCA territory as described above. Small foci of hemorrhagic transformation at the right posterior frontal-parietal region. The most prominent focus measuring about 9 mm would appear to    correspond with the small focus of hemorrhage seen on the CT prior to the thrombectomy procedure.   3.  Moderate supratentorial white matter disease most consistent with microvascular ischemic change.   4.  Old lacunar infarct right cerebellar hemisphere superiorly.   5.  Subcentimeter lacunar size acute infarct at  the inferior aspect of the right cerebellar hemisphere.   6.  Variant anatomy with a diminutive vertebrobasilar system and apparent carotid origin of both posterior cerebral arteries.   7.  6 mm aneurysm at the right MCA genu concordant with catheter angiography.      DX-CHEST-PORTABLE (1 VIEW)   Final Result      Cardiomegaly. No focal consolidation or pleural effusions.      IR-THROMBO MECHANICAL ARTERY,INIT   Final Result         89-year-old patient who presented with acute onset of left arm and leg weakness was found to have a right M1 MCA occlusion on CT angiography with a favorable perfusion profile underwent emergent right middle cerebral artery mechanical thrombectomy as    described above. Initial angiogram demonstrated complete occlusion of the right M1 segment.      The final angiogram showed partial recanalization of the right MCA with improved flow to the superior and inferior division branches. Final recanalization score: TICI 2A      Final angiogram also demonstrated an aneurysm measuring 7 x 8 mm involving the right M1 bifurcation, which was likely the source of the thrombus.         CT-CTA NECK WITH & W/O-POST PROCESSING   Final Result      1.  Mild calcified plaque in the left carotid bulb and proximal left internal carotid artery with less than 50% stenosis.      2.  Soft tissue density in the mediastinum and hilum could represent lymphadenopathy which is incompletely visualized/evaluated on this neck CTA. Recommend follow-up chest CT.      CT-CTA HEAD WITH & W/O-POST PROCESS   Final Result      Right middle cerebral artery M1 occlusion.      Neuro IR is aware of findings.         CT-CEREBRAL PERFUSION ANALYSIS   Final Result      1.  Cerebral blood flow less than 30% likely representing completed infarct = 0 mL.      2.  T Max more than 6 seconds likely representing combination of completed infarct and ischemia = 105 mL.      3.  Mismatched volume likely representing ischemic brain/penumbra = 105  ml      4.  Please note that the cerebral perfusion was performed on the limited brain tissue around the basal ganglia region. Infarct/ischemia outside the CT perfusion sections can be missed in this study.      CT-HEAD W/O   Final Result      1.  Hyperdense right MCA with right temporal and inferior frontoparietal lobe edema with slight loss of gray-white matter differentiation consistent with infarct with subtle area of increased attenuation at the junction of the right frontoparietal lobe    could represent some petechial hemorrhage.      2.  This was discussed with Dr. Baker at 6:55 PM who had already talked to neurology and the neurointerventionist on call.           Assessment/Plan  * Acute right MCA stroke (HCC)- (present on admission)  Assessment & Plan  Patient noted to have Right sided M1 occlusion  Status post mechanical thrombectomy on 6/12  Lipitor 40 mg po qhs   Neuro check Q4   plavix  PT OT eval  Plan rehab DC  Not doing an agressive work up given pt's advanced age    Age-related physical debility  Assessment & Plan  PT/OT eval     Hypertension- (present on admission)  Assessment & Plan  Uncontrolled  Start Norvasc 5mg   Has not required PRN coverage/24hrs  Neurology is recommending neuro blood pressure control 120 - 180     Aortic stenosis- (present on admission)  Assessment & Plan  Reports hx of aortic stenosis, monitor cardiac output especially during thrombectomy  Impressive murmur       VTE prophylaxis: SCDs, heparin

## 2021-06-18 NOTE — THERAPY
Occupational Therapy  Daily Treatment     Patient Name: Sabiha Bingham  Age:  89 y.o., Sex:  female  Medical Record #: [id number]  Today's Date: 6/18/2021       Precautions: Fall Risk, Swallow Precautions ( See Comments)  Comments: L neglect/inattention     Assessment    Pt seen for OT tx. Continues to be limited by decreased activity tolerance, balance deficits, inattention and poor safety awareness impacting ability to complete ADLs and ADL transfers independently. Min A supine > sit, min A sit > stand, min-mod A amb w/ FWW to BR. Required assistance for FWW management d/t L inattention. Mod A toilet transfer for safety. Required extra time for pericare. Min A standing at the sink for balance and safety. Will continue to benefit from OT services while in house.     Plan    Continue current treatment plan.    DC Equipment Recommendations: Unable to determine at this time  Discharge Recommendations: Recommend post-acute placement for additional occupational therapy services prior to discharge home       06/18/21 1232   Cognition    Cognition / Consciousness X   Comments pleasant and motivated. L inattention, poor safety awareness.    Active ROM Upper Body   Active ROM Upper Body  X   Comments LUE grossly weak but using to assist w/ ADLs    Strength Upper Body   Upper Body Strength  X   Comments LUE weaker than RUE    Balance   Sitting Balance (Static) Fair -   Sitting Balance (Dynamic) Fair -   Standing Balance (Static) Poor +   Standing Balance (Dynamic) Poor   Weight Shift Sitting Fair   Weight Shift Standing Poor   Bed Mobility    Supine to Sit Minimal Assist   Sit to Supine   (left up in chair )   Scooting Minimal Assist   Activities of Daily Living   Grooming Minimal Assist;Standing   Upper Body Dressing Minimal Assist   Lower Body Dressing Maximal Assist   Toileting Minimal Assist   Functional Mobility   Sit to Stand Minimal Assist   Bed, Chair, Wheelchair Transfer Moderate Assist   Toilet Transfers Moderate Assist    Comments cues for FWW management and safety w/ turning and reaching back for chair for safety   Short Term Goals   Short Term Goal # 1 pt will demo toilet txf with supv   Goal Outcome # 1 Progressing as expected   Short Term Goal # 2 pt will dress LB with supv   Goal Outcome # 2 Goal not met   Short Term Goal # 3 pt will groom in stance at the sink with supv   Goal Outcome # 3 Progressing as expected   Short Term Goal # 4 pt will participate in activities to improve LUE function w/ supv   Goal Outcome # 4 Progressing as expected   Short Term Goal # 5 pt will locate 3/3 objects to left w/ min cueing   Goal Outcome # 5 Progressing as expected   Anticipated Discharge Equipment and Recommendations   DC Equipment Recommendations Unable to determine at this time   Discharge Recommendations Recommend post-acute placement for additional occupational therapy services prior to discharge home

## 2021-06-19 PROCEDURE — 99232 SBSQ HOSP IP/OBS MODERATE 35: CPT | Performed by: GENERAL PRACTICE

## 2021-06-19 PROCEDURE — 700102 HCHG RX REV CODE 250 W/ 637 OVERRIDE(OP): Performed by: GENERAL PRACTICE

## 2021-06-19 PROCEDURE — 97116 GAIT TRAINING THERAPY: CPT | Mod: CQ

## 2021-06-19 PROCEDURE — A9270 NON-COVERED ITEM OR SERVICE: HCPCS | Performed by: GENERAL PRACTICE

## 2021-06-19 PROCEDURE — A9270 NON-COVERED ITEM OR SERVICE: HCPCS | Performed by: PSYCHIATRY & NEUROLOGY

## 2021-06-19 PROCEDURE — A9270 NON-COVERED ITEM OR SERVICE: HCPCS | Performed by: HOSPITALIST

## 2021-06-19 PROCEDURE — 700102 HCHG RX REV CODE 250 W/ 637 OVERRIDE(OP): Performed by: HOSPITALIST

## 2021-06-19 PROCEDURE — 700111 HCHG RX REV CODE 636 W/ 250 OVERRIDE (IP): Performed by: HOSPITALIST

## 2021-06-19 PROCEDURE — 97530 THERAPEUTIC ACTIVITIES: CPT | Mod: CQ

## 2021-06-19 PROCEDURE — 700102 HCHG RX REV CODE 250 W/ 637 OVERRIDE(OP): Performed by: PSYCHIATRY & NEUROLOGY

## 2021-06-19 PROCEDURE — 770006 HCHG ROOM/CARE - MED/SURG/GYN SEMI*

## 2021-06-19 RX ORDER — AMLODIPINE BESYLATE 5 MG/1
5 TABLET ORAL DAILY
Qty: 30 TABLET | Refills: 0 | Status: SHIPPED | OUTPATIENT
Start: 2021-06-20 | End: 2021-07-20

## 2021-06-19 RX ORDER — CLOPIDOGREL BISULFATE 75 MG/1
75 TABLET ORAL DAILY
Qty: 30 TABLET | Refills: 0 | Status: SHIPPED | OUTPATIENT
Start: 2021-06-20 | End: 2021-07-20

## 2021-06-19 RX ADMIN — AMLODIPINE BESYLATE 5 MG: 5 TABLET ORAL at 04:54

## 2021-06-19 RX ADMIN — HEPARIN SODIUM 5000 UNITS: 5000 INJECTION, SOLUTION INTRAVENOUS; SUBCUTANEOUS at 16:27

## 2021-06-19 RX ADMIN — CLOPIDOGREL BISULFATE 75 MG: 75 TABLET ORAL at 04:54

## 2021-06-19 RX ADMIN — HEPARIN SODIUM 5000 UNITS: 5000 INJECTION, SOLUTION INTRAVENOUS; SUBCUTANEOUS at 04:54

## 2021-06-19 RX ADMIN — ACETAMINOPHEN 650 MG: 325 TABLET, FILM COATED ORAL at 20:49

## 2021-06-19 RX ADMIN — HEPARIN SODIUM 5000 UNITS: 5000 INJECTION, SOLUTION INTRAVENOUS; SUBCUTANEOUS at 22:22

## 2021-06-19 ASSESSMENT — ENCOUNTER SYMPTOMS: WEAKNESS: 1

## 2021-06-19 ASSESSMENT — COGNITIVE AND FUNCTIONAL STATUS - GENERAL
MOVING FROM LYING ON BACK TO SITTING ON SIDE OF FLAT BED: A LITTLE
TURNING FROM BACK TO SIDE WHILE IN FLAT BAD: A LITTLE
STANDING UP FROM CHAIR USING ARMS: A LITTLE
MOBILITY SCORE: 17
WALKING IN HOSPITAL ROOM: A LITTLE
CLIMB 3 TO 5 STEPS WITH RAILING: A LOT
SUGGESTED CMS G CODE MODIFIER MOBILITY: CK
MOVING TO AND FROM BED TO CHAIR: A LITTLE

## 2021-06-19 ASSESSMENT — GAIT ASSESSMENTS
DEVIATION: BRADYKINETIC;SHUFFLED GAIT
ASSISTIVE DEVICE: FRONT WHEEL WALKER
GAIT LEVEL OF ASSIST: MINIMAL ASSIST
DISTANCE (FEET): 200

## 2021-06-19 NOTE — DISCHARGE PLANNING
Anticipated Discharge Disposition: Home to her prior living arrangement with 24/7 caregiver services from her granddaughter, home health has been ordered, referral sent to [facility].     Action: This RN,  spoke with patient's granddaughter April about discharge planning. April asked if patient could discharge home tomorrow with her, she stated that she wanted to get the house ready and purchase a shower chair for patient prior to her coming home. This RN,  asked April if she was indeed living inside the home with her grandmother and not sleeping in the RV she brought to [place] from [place], [place]. April stated she living inside the home with her grandmother, there are three bedrooms at the house. April also confirmed that patient does have a fww at home. I asked April if Rx. Should be sent to [facility] on [address], April confirmed yes that would be great. This RN,  notified Provider Wendy Pike of the above information, plan is for patient to discharge on 06/20/21, 10x10. April will be at bedside at 0800.      Barriers to Discharge: none, anticipate patient will discharge home tomorrow.    Plan: HCM available for any additional discharge needs. None anticipated, referral has been sent to [facility].     Angie Vanegas RN,

## 2021-06-19 NOTE — CARE PLAN
The patient is Stable - Low risk of patient condition declining or worsening    Shift Goals  Clinical Goals: Rest, safety  Patient Goals: Rest  Family Goals: Speech re-eval for diet upgrade    Progress made toward(s) clinical / shift goals:  patient resting comfortably, neuro status stable, free from falls. Will follow up with day shift to have speech re-evaluate diet texture.     Patient is not progressing towards the following goals: n/a

## 2021-06-19 NOTE — PROGRESS NOTES
Hospital Medicine Daily Progress Note    Date of Service  6/19/2021    Chief Complaint  89 y.o. female admitted 6/12/2021 with left-sided weakness    Hospital Course  89 year old woman with PMHx hypertension, osteoporosis, hx of cerebral aneurysm, and AS, presenting with L side weakness, slurred speech, found to have right MCA CVA with aneurysm not a candidate for tPA as outside time window, but did undergo endovascular clot retrieval on 6/12.    She has restarted on plavix therapy given breakthrough event on ASA.    Patient evaluated by SLP, modified barium swallow was performed, patient started on a modified diet with supervision.    PT/OT recommended SNF.      Interval Problem Update  Patient was seen and examined at bedside.    Granddaughter does not want the patient admitted to a skilled nursing facility.  If acute rehab is not an option for the patient, she will opt for home health and will pay for 24/7 supervision.     Home health referral sent out on 06/18, we are pending acceptance, patient is okay for discharge home. Case management will follow up with accepting status.  Granddaughter states patient already has walker at home.  We will anticipate discharge home tomorrow.     Consultants/Specialty  Neurology    Code Status  DNAR/DNI    Disposition  DC home 6/20 with      Review of Systems  Review of Systems   Neurological: Positive for weakness.   All other systems reviewed and are negative.       Physical Exam  Temp:  [36.1 °C (97 °F)-37.2 °C (99 °F)] 36.7 °C (98 °F)  Pulse:  [61-70] 61  Resp:  [17-18] 17  BP: (126-145)/(56-72) 126/72  SpO2:  [92 %-94 %] 92 %    Physical Exam  Vitals and nursing note reviewed.   Constitutional:       General: She is not in acute distress.     Appearance: Normal appearance.   HENT:      Head: Normocephalic and atraumatic.      Mouth/Throat:      Mouth: Mucous membranes are moist.      Pharynx: No oropharyngeal exudate.   Eyes:      Extraocular Movements: Extraocular  movements intact.      Pupils: Pupils are equal, round, and reactive to light.   Cardiovascular:      Rate and Rhythm: Normal rate and regular rhythm.      Pulses: Normal pulses.      Heart sounds: No murmur heard.   No friction rub. No gallop.    Pulmonary:      Effort: Pulmonary effort is normal. No respiratory distress.      Breath sounds: No wheezing, rhonchi or rales.   Abdominal:      General: Bowel sounds are normal. There is no distension.      Palpations: Abdomen is soft. There is no mass.      Tenderness: There is no abdominal tenderness.   Musculoskeletal:         General: No swelling or tenderness. Normal range of motion.      Cervical back: Normal range of motion. No rigidity. No muscular tenderness.      Right lower leg: No edema.      Left lower leg: No edema.   Skin:     General: Skin is warm and dry.      Capillary Refill: Capillary refill takes less than 2 seconds.      Findings: No erythema or rash.   Neurological:      General: No focal deficit present.      Mental Status: She is alert and oriented to person, place, and time.      Motor: No weakness.      Gait: Gait normal.         Fluids    Intake/Output Summary (Last 24 hours) at 6/19/2021 1522  Last data filed at 6/19/2021 1411  Gross per 24 hour   Intake 720 ml   Output --   Net 720 ml       Laboratory                        Imaging  DX-ESOPHAGUS - [id number]   Final Result      MR-BRAIN-W/O   Final Result      1.  Moderate cerebral atrophy expected for the patient's age.   2.  Moderately extensive areas of acute infarction in the right MCA territory as described above. Small foci of hemorrhagic transformation at the right posterior frontal-parietal region. The most prominent focus measuring about 9 mm would appear to    correspond with the small focus of hemorrhage seen on the CT prior to the thrombectomy procedure.   3.  Moderate supratentorial white matter disease most consistent with microvascular ischemic change.   4.  Old lacunar  infarct right cerebellar hemisphere superiorly.   5.  Subcentimeter lacunar size acute infarct at the inferior aspect of the right cerebellar hemisphere.   6.  Variant anatomy with a diminutive vertebrobasilar system and apparent carotid origin of both posterior cerebral arteries.   7.  6 mm aneurysm at the right MCA genu concordant with catheter angiography.      DX-CHEST-PORTABLE (1 VIEW)   Final Result      Cardiomegaly. No focal consolidation or pleural effusions.      IR-THROMBO MECHANICAL ARTERY,INIT   Final Result         89-year-old patient who presented with acute onset of left arm and leg weakness was found to have a right M1 MCA occlusion on CT angiography with a favorable perfusion profile underwent emergent right middle cerebral artery mechanical thrombectomy as    described above. Initial angiogram demonstrated complete occlusion of the right M1 segment.      The final angiogram showed partial recanalization of the right MCA with improved flow to the superior and inferior division branches. Final recanalization score: TICI 2A      Final angiogram also demonstrated an aneurysm measuring 7 x 8 mm involving the right M1 bifurcation, which was likely the source of the thrombus.         CT-CTA NECK WITH & W/O-POST PROCESSING   Final Result      1.  Mild calcified plaque in the left carotid bulb and proximal left internal carotid artery with less than 50% stenosis.      2.  Soft tissue density in the mediastinum and hilum could represent lymphadenopathy which is incompletely visualized/evaluated on this neck CTA. Recommend follow-up chest CT.      CT-CTA HEAD WITH & W/O-POST PROCESS   Final Result      Right middle cerebral artery M1 occlusion.      Neuro IR is aware of findings.         CT-CEREBRAL PERFUSION ANALYSIS   Final Result      1.  Cerebral blood flow less than 30% likely representing completed infarct = 0 mL.      2.  T Max more than 6 seconds likely representing combination of completed infarct  and ischemia = 105 mL.      3.  Mismatched volume likely representing ischemic brain/penumbra = 105 ml      4.  Please note that the cerebral perfusion was performed on the limited brain tissue around the basal ganglia region. Infarct/ischemia outside the CT perfusion sections can be missed in this study.      CT-HEAD W/O   Final Result      1.  Hyperdense right MCA with right temporal and inferior frontoparietal lobe edema with slight loss of gray-white matter differentiation consistent with infarct with subtle area of increased attenuation at the junction of the right frontoparietal lobe    could represent some petechial hemorrhage.      2.  This was discussed with Dr. Baker at 6:55 PM who had already talked to neurology and the neurointerventionist on call.           Assessment/Plan  * Acute right MCA stroke (HCC)- (present on admission)  Assessment & Plan  Patient noted to have Right sided M1 occlusion  Status post mechanical thrombectomy on 6/12  Lipitor 40 mg po qhs   Neuro check Q4   plavix  PT OT eval  Plan rehab DC  Not doing an agressive work up given pt's advanced age    Age-related physical debility  Assessment & Plan  PT/OT eval     Hypertension- (present on admission)  Assessment & Plan  Uncontrolled  Start Norvasc 5mg   Has not required PRN coverage/24hrs  Neurology is recommending neuro blood pressure control 120 - 180     Aortic stenosis- (present on admission)  Assessment & Plan  Reports hx of aortic stenosis, monitor cardiac output especially during thrombectomy  Impressive murmur       VTE prophylaxis: SCDs, heparin

## 2021-06-20 VITALS
WEIGHT: 113.32 LBS | SYSTOLIC BLOOD PRESSURE: 144 MMHG | OXYGEN SATURATION: 92 % | DIASTOLIC BLOOD PRESSURE: 60 MMHG | BODY MASS INDEX: 18.88 KG/M2 | HEIGHT: 65 IN | HEART RATE: 65 BPM | RESPIRATION RATE: 16 BRPM | TEMPERATURE: 98.3 F

## 2021-06-20 PROCEDURE — A9270 NON-COVERED ITEM OR SERVICE: HCPCS | Performed by: HOSPITALIST

## 2021-06-20 PROCEDURE — 700111 HCHG RX REV CODE 636 W/ 250 OVERRIDE (IP): Performed by: HOSPITALIST

## 2021-06-20 PROCEDURE — 700102 HCHG RX REV CODE 250 W/ 637 OVERRIDE(OP): Performed by: PSYCHIATRY & NEUROLOGY

## 2021-06-20 PROCEDURE — A9270 NON-COVERED ITEM OR SERVICE: HCPCS | Performed by: PSYCHIATRY & NEUROLOGY

## 2021-06-20 PROCEDURE — 99239 HOSP IP/OBS DSCHRG MGMT >30: CPT | Performed by: GENERAL PRACTICE

## 2021-06-20 PROCEDURE — 700102 HCHG RX REV CODE 250 W/ 637 OVERRIDE(OP): Performed by: HOSPITALIST

## 2021-06-20 RX ORDER — POTASSIUM CHLORIDE 20 MEQ/1
20 TABLET, EXTENDED RELEASE ORAL DAILY
Qty: 30 TABLET | Refills: 0 | Status: SHIPPED | OUTPATIENT
Start: 2021-06-20 | End: 2021-07-20

## 2021-06-20 RX ORDER — FUROSEMIDE 20 MG/1
20 TABLET ORAL DAILY
Qty: 30 TABLET | Refills: 0 | Status: SHIPPED | OUTPATIENT
Start: 2021-06-20 | End: 2021-07-20

## 2021-06-20 RX ADMIN — AMLODIPINE BESYLATE 5 MG: 5 TABLET ORAL at 04:17

## 2021-06-20 RX ADMIN — HEPARIN SODIUM 5000 UNITS: 5000 INJECTION, SOLUTION INTRAVENOUS; SUBCUTANEOUS at 04:17

## 2021-06-20 RX ADMIN — CLOPIDOGREL BISULFATE 75 MG: 75 TABLET ORAL at 04:17

## 2021-06-20 NOTE — DISCHARGE SUMMARY
Discharge Summary    CHIEF COMPLAINT ON ADMISSION  Chief Complaint   Patient presents with   • Possible Stroke     Pt presents to the ED after family called [facility]; per family pt was speaking clearly on the phone this morning at around [id number]. Pt normall lives alone and is independent. HX and medication list unknown.        Reason for Admission  Stroke IR     Admission Date  6/12/2021    CODE STATUS  DNAR/DNI    HPI & HOSPITAL COURSE  89 year old woman with PMHx hypertension, osteoporosis, hx of cerebral aneurysm, and AS, presenting with L side weakness, slurred speech, found to have right MCA CVA with aneurysm not a candidate for tPA as outside time window, but did undergo endovascular clot retrieval on 6/12.    She has restarted on plavix therapy given breakthrough event on ASA.    Patient evaluated by SLP, modified barium swallow was performed, patient started on a modified diet with supervision.    PT/OT recommended SNF.    Unfortunately acute rehab was denied due to unclear discharge after rehab.  After speaking with the granddaughter, she prefers if the patient if unable to attend acute rehab to come home with home health, she does not want a skilled nursing facility. Granddaughter will be able to provide 24/7 care, she reports having appropriate DME, walker, shower chair.    At this time a referral has been sent out for home health, we are still pending acceptance, however patient is okay for discharge home, case management will follow up if patient is accepted or not.    Therefore, she is discharged in good and stable condition to home with organized home healthcare and close outpatient follow-up.    The patient met 2-midnight criteria for an inpatient stay at the time of discharge.    Discharge Date  06/20/2021    FOLLOW UP ITEMS POST DISCHARGE  Primary care physician  Stroke clinic    DISCHARGE DIAGNOSES  Principal Problem:    Acute right MCA stroke (HCC) POA: Yes  Active Problems:    Aortic stenosis POA:  Yes    Hypertension POA: Yes    Osteoporosis POA: Unknown    Age-related physical debility POA: Unknown  Resolved Problems:    * No resolved hospital problems. *      FOLLOW UP  Sharron Barillas M.D.  [id number] [id number]  [phone]    In 1 week        MEDICATIONS ON DISCHARGE     Medication List      START taking these medications      Instructions   amLODIPine 5 MG Tabs  Commonly known as: NORVASC   Take 1 tablet by mouth every day for 30 days.  Dose: 5 mg     clopidogrel 75 MG Tabs  Commonly known as: PLAVIX   Take 1 tablet by mouth every day for 30 days.  Dose: 75 mg        CONTINUE taking these medications      Instructions   alendronate 70 MG Tabs  Commonly known as: FOSAMAX   Take 70 mg by mouth every 7 days.  Dose: 70 mg     furosemide 20 MG Tabs  Commonly known as: LASIX   Take 1 tablet by mouth every day for 30 days.  Dose: 20 mg     potassium chloride SA 20 MEQ Tbcr  Commonly known as: Kdur   Take 1 tablet by mouth every day for 30 days.  Dose: 20 mEq        STOP taking these medications    NON SPECIFIED            Allergies  Allergies   Allergen Reactions   • Pine      Seasonal allergies to pine pollen       DIET  Orders Placed This Encounter   Procedures   • Diet Order Diet: Level 3 - Liquidised; Liquid level: Level 2 - Mildly Thick; Tray Modifications (optional): SLP - 1:1 Supervision by Nursing, SLP - Deliver to Nursing Station     Standing Status:   Standing     Number of Occurrences:   1     Order Specific Question:   Diet:     Answer:   Level 3 - Liquidised [26]     Order Specific Question:   Liquid level     Answer:   Level 2 - Mildly Thick     Order Specific Question:   Tray Modifications (optional)     Answer:   SLP - 1:1 Supervision by Nursing     Order Specific Question:   Tray Modifications (optional)     Answer:   SLP - Deliver to Nursing Station       ACTIVITY  As tolerated.  Weight bearing as  tolerated    CONSULTATIONS  Neurology    PROCEDURES  Thrombectomy    LABORATORY  Lab Results   Component Value Date    SODIUM 136 06/14/2021    POTASSIUM 3.8 06/14/2021    CHLORIDE 104 06/14/2021    CO2 22 06/14/2021    GLUCOSE 94 06/14/2021    BUN 22 06/14/2021    CREATININE 0.78 06/14/2021        Lab Results   Component Value Date    WBC 10.3 06/14/2021    HEMOGLOBIN 9.6 (L) 06/14/2021    HEMATOCRIT 29.1 (L) 06/14/2021    PLATELETCT 151 (L) 06/14/2021      DX-ESOPHAGUS - [id number]   Final Result      MR-BRAIN-W/O   Final Result      1.  Moderate cerebral atrophy expected for the patient's age.   2.  Moderately extensive areas of acute infarction in the right MCA territory as described above. Small foci of hemorrhagic transformation at the right posterior frontal-parietal region. The most prominent focus measuring about 9 mm would appear to    correspond with the small focus of hemorrhage seen on the CT prior to the thrombectomy procedure.   3.  Moderate supratentorial white matter disease most consistent with microvascular ischemic change.   4.  Old lacunar infarct right cerebellar hemisphere superiorly.   5.  Subcentimeter lacunar size acute infarct at the inferior aspect of the right cerebellar hemisphere.   6.  Variant anatomy with a diminutive vertebrobasilar system and apparent carotid origin of both posterior cerebral arteries.   7.  6 mm aneurysm at the right MCA genu concordant with catheter angiography.      DX-CHEST-PORTABLE (1 VIEW)   Final Result      Cardiomegaly. No focal consolidation or pleural effusions.      IR-THROMBO MECHANICAL ARTERY,INIT   Final Result         89-year-old patient who presented with acute onset of left arm and leg weakness was found to have a right M1 MCA occlusion on CT angiography with a favorable perfusion profile underwent emergent right middle cerebral artery mechanical thrombectomy as    described above. Initial angiogram demonstrated complete occlusion of the right  M1 segment.      The final angiogram showed partial recanalization of the right MCA with improved flow to the superior and inferior division branches. Final recanalization score: TICI 2A      Final angiogram also demonstrated an aneurysm measuring 7 x 8 mm involving the right M1 bifurcation, which was likely the source of the thrombus.         CT-CTA NECK WITH & W/O-POST PROCESSING   Final Result      1.  Mild calcified plaque in the left carotid bulb and proximal left internal carotid artery with less than 50% stenosis.      2.  Soft tissue density in the mediastinum and hilum could represent lymphadenopathy which is incompletely visualized/evaluated on this neck CTA. Recommend follow-up chest CT.      CT-CTA HEAD WITH & W/O-POST PROCESS   Final Result      Right middle cerebral artery M1 occlusion.      Neuro IR is aware of findings.         CT-CEREBRAL PERFUSION ANALYSIS   Final Result      1.  Cerebral blood flow less than 30% likely representing completed infarct = 0 mL.      2.  T Max more than 6 seconds likely representing combination of completed infarct and ischemia = 105 mL.      3.  Mismatched volume likely representing ischemic brain/penumbra = 105 ml      4.  Please note that the cerebral perfusion was performed on the limited brain tissue around the basal ganglia region. Infarct/ischemia outside the CT perfusion sections can be missed in this study.      CT-HEAD W/O   Final Result      1.  Hyperdense right MCA with right temporal and inferior frontoparietal lobe edema with slight loss of gray-white matter differentiation consistent with infarct with subtle area of increased attenuation at the junction of the right frontoparietal lobe    could represent some petechial hemorrhage.      2.  This was discussed with Dr. Baker at 6:55 PM who had already talked to neurology and the neurointerventionist on call.        Total time of the discharge process exceeds 45 minutes.

## 2021-06-20 NOTE — THERAPY
Physical Therapy   Daily Treatment     Patient Name: Sabiha Bingham  Age:  89 y.o., Sex:  female  Medical Record #: [id number]  Today's Date: 6/19/2021     Precautions: Fall Risk, Swallow Precautions ( See Comments)    Assessment    Pt was pleasant and agreeable to therapy session. She continues to present with poor safety awareness and left inattention. She reached EOB with spv and bed rail only. She required Romi for STS and max vc for sequencing when turning. She ambulated 200ft with Romi . Mainly requiring assist for management of fww as she had tendency to left. No overt lob but vc to slow down as well. Granddaughter present reporting she is going to be living with her in home and wants to take care of her 24/7 discussed using fww versus 4ww for safety and guarding with all mobility. As well answered questions she had regarding specific DMW. Granddaughter receptive to all education at this time. Discussed with PT recommend home health as she will have 24/7assist at home from family.     Plan    Continue current treatment plan.    DC Equipment Recommendations: front wheeled walker   Discharge Recommendations: Recommend home health for continued physical therapy services       06/19/21 1425   Other Treatments   Other Treatments Provided educated granddaughter on proper guading, safety wiht fww and dme equipment.    Balance   Sitting Balance (Static) Fair   Sitting Balance (Dynamic) Fair -   Standing Balance (Static) Fair -   Standing Balance (Dynamic) Poor +   Weight Shift Sitting Fair   Weight Shift Standing Poor   Skilled Intervention Verbal Cuing;Sequencing   Comments standing with fww    Gait Analysis   Gait Level Of Assist Minimal Assist   Assistive Device Front Wheel Walker   Distance (Feet) 200   # of Times Distance was Traveled 1   Deviation Bradykinetic;Shuffled Gait   Skilled Intervention Verbal Cuing;Postural Facilitation   Comments increased gait distance with Romi for guiding of fww and balance. She had  tendency to veer to left with frequent redirection to middle. She presents with inconsistent speed occassionally going too fast increasing her risk for falling overall no lob at this time    Bed Mobility    Supine to Sit Supervised  (bed features )   Sit to Supine   (up in chair )   Scooting Supervised   Comments hob elevated    Functional Mobility   Sit to Stand Minimal Assist   Bed, Chair, Wheelchair Transfer Minimal Assist   Skilled Intervention Verbal Cuing   Comments max vc for seqeuncing when turning to transfer to chair    Short Term Goals    Short Term Goal # 1 pt will perform supine <> sit without bed features with SPV in 6 visits to be able to get in/out of bed at home   Goal Outcome # 1 goal not met   Short Term Goal # 2 pt will perform all functional xfrs with SPV in 6 visits for improved independence   Goal Outcome # 2 Goal not met   Short Term Goal # 3 pt will ambulate 150ft with FWW and SPV in 6 visits to access home   Goal Outcome # 3 Goal not met   Short Term Goal # 4 pt will negotiate 2 steps with SPV in 6 visits to access home   Goal Outcome # 4 Goal not met

## 2021-06-20 NOTE — CARE PLAN
The patient is Stable - Low risk of patient condition declining or worsening    Shift Goals  Clinical Goals: monitor neuro status  Patient Goals: Rest  Family goals:    Progress made toward(s) clinical / shift goals:  neuro status stable, patient resting comfortably.     Patient is not progressing towards the following goals: n/a

## 2021-06-20 NOTE — PROGRESS NOTES
Discharge teaching completed with pt and granddaughter (April), questions/concerns addressed. [facility] follow-up made, core measures sheet filed, and NIHSS completed on discharge. All belongings sent with pt.

## 2021-06-20 NOTE — DISCHARGE INSTRUCTIONS
Discharge Instructions    Discharged to home by car with relative. Discharged via wheelchair, hospital escort: Yes.  Special equipment needed: Not Applicable    Be sure to schedule a follow-up appointment with your primary care doctor or any specialists as instructed.     Please continue taking clopidogrel 75 mg every day (this is to help prevent future strokes).  Please continue taking Lasix 20 mg daily with potassium, this is to help with your aortic stenosis (can also lower blood pressure).    Amlodipine 5 mg daily is to help control your blood pressure, if you realize that your blood pressure hangs very low in the low 100s, 90s (top number of BP), you can stop taking this medication as Lasix will also help control your blood pressure.    Please follow-up with your primary care within 1 to 2 weeks.  Please follow-up with neurology stroke clinic in a few weeks.  Please take care and be well.    Discharge Plan:   Diet Plan: Discussed  Activity Level: Discussed  Confirmed Follow up Appointment: Patient to Call and Schedule Appointment  Confirmed Symptoms Management: Discussed  Medication Reconciliation Updated: Yes    I understand that a diet low in cholesterol, fat, and sodium is recommended for good health. Unless I have been given specific instructions below for another diet, I accept this instruction as my diet prescription.   Other diet: N/A    Special Instructions:     Stroke/CVA/TIA/Hemorrhagic Ischemia Discharge Instructions  You have had a stroke. Your risk factors have been identified as follows:  Age - Over 55  High blood pressure  Atrial Fibrillation  It is important that you reduce your risk factors to avoid another stroke in the future. Here are some general guidelines to follow:  · Eat healthy - avoid food high in fat.  · Get regular exercise.  · Maintain a healthy weight.  · Avoid smoking.  · Avoid alcohol and illegal drug use.  · Take your medications as directed.  For more information regarding  risk factors, refer to pages 17-19 in your Stroke Patient Education Guide. Stroke Education Guide was given to patient and family member.    Warning signs of a stroke include (which can also be found on page 3 of your Stroke Patient Education Guide):  · Sudden numbness of weakness of the face, arm or leg (especially on one side of the body).  · Sudden confusion, trouble speaking or understanding.  · Sudden trouble seeing in one or both eyes.  · Sudden trouble walking, dizziness, loss of balance or coordination.  · Sudden severe headache with no known cause.  It is very important to get treatment quickly when a stroke occurs. If you experience any of the above warning signs, call 911 immediately.     Some patients who have had a stroke will be going home on a blood thinner medication called Warfarin (Coumadin).  This medication requires very close monitoring and follow up.  This follow up can be provided by either your Primary Care Physician or by [facility] Outpatient Anticoagulation Service.  The Outpatient Anticoagulation Service is located at the [facility] at [facility] ([facility]).  If you do not know when your follow up appointment is scheduled, call [phone] to verify your appointment time.      · Is patient discharged on Warfarin / Coumadin?   No     Depression / Suicide Risk    As you are discharged from this [facility], it is important to learn how to keep safe from harming yourself.    Recognize the warning signs:  · Abrupt changes in personality, positive or negative- including increase in energy   · Giving away possessions  · Change in eating patterns- significant weight changes-  positive or negative  · Change in sleeping patterns- unable to sleep or sleeping all the time   · Unwillingness or inability to communicate  · Depression  · Unusual sadness, discouragement and loneliness  · Talk of wanting to die  · Neglect of personal  appearance   · Rebelliousness- reckless behavior  · Withdrawal from people/activities they love  · Confusion- inability to concentrate     If you or a loved one observes any of these behaviors or has concerns about self-harm, here's what you can do:  · Talk about it- your feelings and reasons for harming yourself  · Remove any means that you might use to hurt yourself (examples: pills, rope, extension cords, firearm)  · Get professional help from the community (Mental Health, Substance Abuse, psychological counseling)  · Do not be alone:Call your Safe Contact- someone whom you trust who will be there for you.  · Call your local CRISIS HOTLINE 528-8159 or 854-567-2452  · Call your local Children's Mobile Crisis Response Team [place] (154) 754-4959 or www.Grabbed  · Call the toll free National Suicide Prevention Hotlines   · National Suicide Prevention Lifeline 078-163-AYNK (6693)  · National Hope Line Network 800-SUICIDE (242-0259)

## 2021-08-31 ENCOUNTER — TELEPHONE (OUTPATIENT)
Dept: PHYSICAL THERAPY | Facility: MEDICAL CENTER | Age: 86
End: 2021-08-31

## 2021-09-30 NOTE — PROCEDURE: LIQUID NITROGEN
Render Note In Bullet Format When Appropriate: No
Duration Of Freeze Thaw-Cycle (Seconds): 3
Consent: The patient's consent was obtained including but not limited to risks of crusting, scabbing, blistering, scarring, darker or lighter pigmentary change, recurrence, incomplete removal and infection.
Number Of Freeze-Thaw Cycles: 2 freeze-thaw cycles
Post-Care Instructions: I reviewed with the patient in detail post-care instructions. Patient is to wear sunprotection, and avoid picking at any of the treated lesions. Pt may apply Vaseline to crusted or scabbing areas.
Detail Level: Simple
Aperture Size (Optional): C
continue depakote at 250 mg BID, Zyprexa Zydis 5mg PRN PO, Geodon 20mg IM PRN, Ativan 2mg PO/IM PRN

## 2024-02-08 NOTE — PROCEDURE: MOHS SURGERY PHONE CONSULTATION
Detail Level: Simple
Pathology Accession #: [id number]
Does The Patient Have A Pacemaker Or Defibrillator?: No
Date Of Mohs Surgery: 08/01/2018
Has The Pathology Report Been Received?: Yes
Time Of Mohs Surgery: 11:40
Which Antibiotic Do They Take For Surgical Prophylaxis?: Amoxicillin (2 grams)
Referring Provider: Dr. Cramer
Patient Reported Location: right inferior nasal cheek
Office Location Of Mohs Surgery: [facility]
02/06/2024